# Patient Record
Sex: FEMALE | Race: OTHER | HISPANIC OR LATINO | Employment: UNEMPLOYED | URBAN - METROPOLITAN AREA
[De-identification: names, ages, dates, MRNs, and addresses within clinical notes are randomized per-mention and may not be internally consistent; named-entity substitution may affect disease eponyms.]

---

## 2020-11-13 ENCOUNTER — HOSPITAL ENCOUNTER (OUTPATIENT)
Dept: RADIOLOGY | Facility: MEDICAL CENTER | Age: 30
End: 2020-11-13
Attending: NURSE PRACTITIONER

## 2020-11-13 DIAGNOSIS — E04.1 THYROID NODULE: ICD-10-CM

## 2023-06-22 ENCOUNTER — GYNECOLOGY VISIT (OUTPATIENT)
Dept: OBGYN | Facility: CLINIC | Age: 33
End: 2023-06-22

## 2023-06-22 VITALS
HEIGHT: 65 IN | SYSTOLIC BLOOD PRESSURE: 90 MMHG | DIASTOLIC BLOOD PRESSURE: 52 MMHG | BODY MASS INDEX: 23.19 KG/M2 | WEIGHT: 139.2 LBS

## 2023-06-22 DIAGNOSIS — E07.9 THYROID MASS: ICD-10-CM

## 2023-06-22 DIAGNOSIS — N93.8 DUB (DYSFUNCTIONAL UTERINE BLEEDING): ICD-10-CM

## 2023-06-22 LAB
POCT INT CON NEG: NEGATIVE
POCT INT CON POS: POSITIVE
POCT URINE PREGNANCY TEST: POSITIVE

## 2023-06-22 PROCEDURE — 3078F DIAST BP <80 MM HG: CPT | Performed by: PHYSICIAN ASSISTANT

## 2023-06-22 PROCEDURE — 76805 OB US >/= 14 WKS SNGL FETUS: CPT | Performed by: PHYSICIAN ASSISTANT

## 2023-06-22 PROCEDURE — 81025 URINE PREGNANCY TEST: CPT | Performed by: PHYSICIAN ASSISTANT

## 2023-06-22 PROCEDURE — 3074F SYST BP LT 130 MM HG: CPT | Performed by: PHYSICIAN ASSISTANT

## 2023-06-22 PROCEDURE — 99203 OFFICE O/P NEW LOW 30 MIN: CPT | Performed by: PHYSICIAN ASSISTANT

## 2023-06-22 NOTE — PROGRESS NOTES
"Marvin Quintero is a 33 y.o. female who presents with Gynecologic Exam ()  Pt is somewhat sure of LMP. Dating is by US today.   OBHx: First pregnancy, FOB present and supportive  PMHx: Denies though mass palpated on thyroid - needs US and testing. Pt notes it has been there for ~ 1yr, no changes in size and no pain  SHX: Denies  Allergies: NKDA  Social: Denies tob, etoh or drug use   Meds; Taking PNV only  Pt currently denies cramping, bleeding or pain. No FM.             HPI    Review of Systems   All other systems reviewed and are negative.             Objective     BP 90/52   Ht 5' 4.96\"   Wt 139 lb 3.2 oz   LMP 02/08/2023   BMI 23.19 kg/m²      Physical Exam  Vitals reviewed.   Constitutional:       Appearance: She is well-developed.   HENT:      Head: Normocephalic and atraumatic.   Eyes:      Pupils: Pupils are equal, round, and reactive to light.   Neck:      Thyroid: Thyroid mass (~1cm soft, NT mass palpated LLQ) present. No thyromegaly.     Cardiovascular:      Rate and Rhythm: Normal rate and regular rhythm.      Heart sounds: Normal heart sounds.   Pulmonary:      Effort: Pulmonary effort is normal. No respiratory distress.      Breath sounds: Normal breath sounds.   Abdominal:      General: Bowel sounds are normal. There is no distension.      Palpations: Abdomen is soft.      Tenderness: There is no abdominal tenderness.   Genitourinary:     Exam position: Supine.      Uterus: Enlarged (Gravid, uterus c/w 20-21 wk size). Not deviated and not tender.    Musculoskeletal:      Cervical back: Normal range of motion and neck supple.   Skin:     General: Skin is warm and dry.      Findings: No erythema.   Neurological:      Mental Status: She is alert.      Deep Tendon Reflexes: Reflexes are normal and symmetric.   Psychiatric:         Behavior: Behavior normal.         Thought Content: Thought content normal.               Abd US shows single viable IUP at 21w3d with DANISH " 10/30/23. +FM, +cardiac activity at 147bpm. Ant placenta, MELONIE subjectively wnl. Fetus in vtx position.    BPD: 5.27cm (5.17, 5.41. 5.23)  HC: 19.30cm (19.54, 19.16, 19.19)  AC: 16.29cm (16.22, 16.29, 16.36)  FL: 3.38cm (3.31, 3.44, 3.40)    Per ACOG guidelines, I would recommend changing the DANISH based on today's US, as the DANISH is >10 days different than dates based on LMP. FINAL DANISH 10/30/23 based on US today.                Assessment & Plan        1. DUB (dysfunctional uterine bleeding)  - F/u 1-2 wk for NOB visit, will need PNL and US asap  - First PAP at next visit  - POCT Pregnancy    2. Thyroid mass - palpated LLQ - needs US, lab testing  - Pt needs US and blood testing at next visit after financial appt - pt aware

## 2023-06-22 NOTE — PROGRESS NOTES
Patient here for GYN/  LMP: 2/8/2023  DANISH: 11/15/2023  GA: 19w4d  Last pap: Pt states has never had one.   # [phone]  UPT positive   Pt states no complaints.

## 2023-06-27 ENCOUNTER — APPOINTMENT (OUTPATIENT)
Dept: OBGYN | Facility: CLINIC | Age: 33
End: 2023-06-27

## 2023-07-11 ENCOUNTER — INITIAL PRENATAL (OUTPATIENT)
Dept: OBGYN | Facility: CLINIC | Age: 33
End: 2023-07-11

## 2023-07-11 VITALS — SYSTOLIC BLOOD PRESSURE: 99 MMHG | DIASTOLIC BLOOD PRESSURE: 68 MMHG | BODY MASS INDEX: 23.29 KG/M2 | WEIGHT: 139.8 LBS

## 2023-07-11 DIAGNOSIS — Z34.02 ENCOUNTER FOR SUPERVISION OF NORMAL FIRST PREGNANCY, SECOND TRIMESTER: ICD-10-CM

## 2023-07-11 DIAGNOSIS — E07.9 THYROID MASS: ICD-10-CM

## 2023-07-11 PROCEDURE — 0500F INITIAL PRENATAL CARE VISIT: CPT | Performed by: ADVANCED PRACTICE MIDWIFE

## 2023-07-11 PROCEDURE — 8198 PREG CTR PKG RATE (SYSTEM): Performed by: ADVANCED PRACTICE MIDWIFE

## 2023-07-11 PROCEDURE — 3074F SYST BP LT 130 MM HG: CPT | Performed by: ADVANCED PRACTICE MIDWIFE

## 2023-07-11 PROCEDURE — 3078F DIAST BP <80 MM HG: CPT | Performed by: ADVANCED PRACTICE MIDWIFE

## 2023-07-11 NOTE — PROGRESS NOTES
"Risk factors:   thyroid nodule  Referrals made today:   none    Subjective:   Laura Sheth is a 33 y.o.  who presents for her new OB exam.  She is 24w1d with an DANISH of Estimated Date of Delivery: 10/30/23 by US.   She is feeling well and has no concerns at this time. She reports no ER visits or previous care in this pregnancy. Reports good fetal movement.     Patient was noted to have palpable thyroid nodule that has been present \"for years.\" She last had imaging in  from [facility]. Was recommended to get TSH and reimaging    Vaginal bleeding no  Pain   no  Cramping  no    History reviewed. No pertinent past medical history.    Psych History   Depression:  no  Anxiety   no  Bipolar   no  Postpartum Mood Changes N/a    History reviewed. No pertinent surgical history.     OB History    Para Term  AB Living   1             SAB IAB Ectopic Molar Multiple Live Births                    # Outcome Date GA Lbr Puneet/2nd Weight Sex Delivery Anes PTL Lv   1 Current                 Gynecological History  STIs  no  HSV  no  Abnormal pap no      Family History   Problem Relation Age of Onset    No Known Problems Mother     No Known Problems Father     No Known Problems Brother     No Known Problems Maternal Grandmother     No Known Problems Maternal Grandfather      Denies genetic disorders in family history.     FOB is involved   Pregnancy is un planned but desired.    She is currently not working  .   Denies alcohol use, drug use, or tobacco use in pregnancy.     Denies any current or hx of sexual, emotional or physical abuse or trauma.     Current Medications: PNV    Allergies: NKDA      Objective:      Vitals:    23 0942   BP: 99/68   Weight: 139 lb 12.8 oz        See Prenatal Flowsheet for vitals    Well nourished female in no acute distress  A& O x 3  Heart RRR  LCTAB  No edema noted and pulses WNL bilaterally in lower extremities; no claudication  BS x 4; no guarding or " tenderness    Assessment:      1.  IUP @ 24w1d per US      2.  S=D      3.  See problem list as follows       Patient Active Problem List    Diagnosis Date Noted    DUB (dysfunctional uterine bleeding) 06/22/2023    Thyroid mass - palpated LLQ - needs US, lab testing 06/22/2023         Plan:   -  pap not done today; planning postpartum collection  - Discussed carrier screening and genetic testing in pregnancy. Patient too late for AFP.  - Prenatal labs ordered - lab slip provided  - Discussed PNV, nutrition, adequate water intake, and exercise/weight gain in pregnancy  - S/sx of pregnancy warning signs and PTL precautions given  - Complete OB US in 1 wks  - Return to clinic in 4 weeks.      Discussed with patient thyroid nodule and reimaging. Will also add TSH to labs today.       ONUR Umana

## 2023-07-11 NOTE — PROGRESS NOTES
Pt. Here for NOB visit today.  #[phone]  First prenatal care  Pt. States she feels good, no issues or concerns.   Pharmacy verified  Chaperone offered and indicated

## 2023-07-18 ENCOUNTER — APPOINTMENT (OUTPATIENT)
Dept: OBGYN | Facility: CLINIC | Age: 33
End: 2023-07-18
Payer: MEDICAID

## 2023-07-21 ENCOUNTER — APPOINTMENT (OUTPATIENT)
Dept: RADIOLOGY | Facility: IMAGING CENTER | Age: 33
End: 2023-07-21
Attending: ADVANCED PRACTICE MIDWIFE

## 2023-07-21 DIAGNOSIS — Z34.02 ENCOUNTER FOR SUPERVISION OF NORMAL FIRST PREGNANCY, SECOND TRIMESTER: ICD-10-CM

## 2023-07-21 PROCEDURE — 76805 OB US >/= 14 WKS SNGL FETUS: CPT | Mod: TC | Performed by: ADVANCED PRACTICE MIDWIFE

## 2023-08-10 ENCOUNTER — ROUTINE PRENATAL (OUTPATIENT)
Dept: OBGYN | Facility: CLINIC | Age: 33
End: 2023-08-10

## 2023-08-10 VITALS — WEIGHT: 146 LBS | SYSTOLIC BLOOD PRESSURE: 102 MMHG | DIASTOLIC BLOOD PRESSURE: 70 MMHG | BODY MASS INDEX: 24.32 KG/M2

## 2023-08-10 DIAGNOSIS — Z34.03 SUPERVISION OF NORMAL FIRST PREGNANCY IN THIRD TRIMESTER: Primary | ICD-10-CM

## 2023-08-10 PROCEDURE — 90471 IMMUNIZATION ADMIN: CPT | Performed by: NURSE PRACTITIONER

## 2023-08-10 PROCEDURE — 90715 TDAP VACCINE 7 YRS/> IM: CPT | Performed by: NURSE PRACTITIONER

## 2023-08-10 PROCEDURE — 3078F DIAST BP <80 MM HG: CPT | Performed by: NURSE PRACTITIONER

## 2023-08-10 PROCEDURE — 3074F SYST BP LT 130 MM HG: CPT | Performed by: NURSE PRACTITIONER

## 2023-08-10 PROCEDURE — 0502F SUBSEQUENT PRENATAL CARE: CPT | Performed by: NURSE PRACTITIONER

## 2023-08-10 NOTE — PROGRESS NOTES
S) Pt is a 33 y.o.   at 28w3d  gestation. Routine prenatal care today. No concerns today. Has not done blood work, was not sure where to go. Glucose ordered today, and instructions provided. Will do all blood work tomorrow morning. Has US for her thyroid on 2023. OB US is up to date. Reviewed [facility] today, will get Tdap today. No BTL. PTL precautions reviewed, all questions answered.    Fetal movement Normal  Cramping no  VB no  LOF no   Denies dysuria. Generally feels well today. Good self-care activities identified. Denies headaches, swelling, visual changes, or epigastric pain .     O) /70   Wt 146 lb         Labs:       PNL: Not done yet       GCT: Ordered today        AFP: Not Examined       GBS: N/A       Pertinent ultrasound -        2023- Survey WNL, MELONIE 15.28cm, c/w prev dating. EFW 37.2%    A) IUP at 28w3d       S=D         Patient Active Problem List    Diagnosis Date Noted    Supervision of normal first pregnancy in third trimester 08/10/2023    DUB (dysfunctional uterine bleeding) 2023    Thyroid mass - palpated LLQ - needs US, lab testing 2023          SVE: deferred       Chaperone offered: n/a         TDAP: yes       FLU: no        BTL: no       :  n/a       C/S Consent:  n/a       IOL or C/S scheduled: no       LAST PAP: None on file, will do PP         P) s/s ptl vs general discomforts. Fetal movements reviewed. General ed and anticipatory guidance. Nutrition/exercise/vitamin. Plans breast Plans pp contraception- unsure  Continue PNV.

## 2023-08-10 NOTE — LETTER
Cuente los Movimientos de ly Bebé  Otro paso importante para la roxanna de ly bebé    Laura Sheth     [facility]            Dept: [phone]    ¿Cuántas semanas tiene de embarazo? 28w3d    Fecha cuando tiene que comenzar a contar el movimiento: 08/10/2023                  [place] debe usar dolores diagrama    Loyd manera en que ly doctor puede controlar a roxanna de ly bebé es sabiendo cuantas veces se mueve ly bebé en el útero, o por medio de las “pataditas”.  Usted podrá ayudarle a ly médico al usar cada día el siguiente diagrama.    Cada día, usted debe prestar atención a cuantas horas le lleva a ly bebé moverse 10 veces.  Comience a contar en la mañana, lo antes posible después de haberse levantado.    Primeramente, escriba la hora en que se mueve ly bebé, hasta llegar a 10 veces.  Colóquele un check o palomita a cada cuadrito cada vez que ly bebé se mueva hasta que complete 10 veces.  Escriba la hora cuando termine de contar 10 veces en la última columna.  Sume el total del tiempo que le llevó contar los 10 movimientos.  Finalmente, complete el cuadrito de cuantas horas le llevó hacerlo.    Después de adrian contado los 10 movimientos, ya no tendrá que contar los demás movimientos por el sheila del día.  A la mañana siguiente, comience a contar de nuevo cuantas veces se mueve el bebé desde el momento en que se levante.    ¿Qué tendría que considerarse un “movimiento”?  Es difícil de decirlo porque es distinto de loyd madre a otra, y de un embarazo a otro.  Lo importante es que cuente el movimiento de la misma manera love el transcurso de ly embarazo.  Si tiene preguntas adicionales, pregúntele a ly doctor.    ¡Cuente cuidadosamente cada día!     MUESTRA:  Semana 28    ¿Cuántas horas le ha llevado sentir 10 movimientos?        Hora de Inicio     1     2     3     4     5     6     7     8     9     10   Hora de Finlizar   Ramandeep. 8:20           11:40   Mar.                Mié.               Jue.               Vie.               Sáb.               Dom.                 IMPORTANTE:  Usted debe contactar a ly doctor si le lleva más de 2 horas sentir 10 movimientos de ly bebé.    Cada mañana, escriba la hora de inicio y comience a contar los movimientos de ly bebé.  Hágalo colocándole un check o palomita a cada cuadrito cada vez que sienta un movimiento de ly bebé.  Cuando haya sentido 10 “pataditas”, escriba la hora en que terminó de contar en la última columna.  Luego, complete en la cajita (arriba de la ashok de check o palomita) el número total de horas que le llevó hacerlo.  Asegúrese de leer completamente las instrucciones en la página anterior.

## 2023-08-10 NOTE — PROGRESS NOTES
Pt here today for OB follow up  Pt states no complaints  Reports +FM  Good # [phone] (home)    Pharmacy Confirmed.  Chaperone offered and declined  Tdap today  DEBORAH given

## 2023-08-16 ENCOUNTER — HOSPITAL ENCOUNTER (OUTPATIENT)
Dept: LAB | Facility: MEDICAL CENTER | Age: 33
End: 2023-08-16
Attending: NURSE PRACTITIONER
Payer: COMMERCIAL

## 2023-08-16 ENCOUNTER — HOSPITAL ENCOUNTER (OUTPATIENT)
Dept: RADIOLOGY | Facility: MEDICAL CENTER | Age: 33
End: 2023-08-16
Attending: ADVANCED PRACTICE MIDWIFE
Payer: MEDICAID

## 2023-08-16 DIAGNOSIS — E07.9 THYROID MASS: ICD-10-CM

## 2023-08-16 DIAGNOSIS — Z34.03 SUPERVISION OF NORMAL FIRST PREGNANCY IN THIRD TRIMESTER: ICD-10-CM

## 2023-08-16 DIAGNOSIS — Z34.02 ENCOUNTER FOR SUPERVISION OF NORMAL FIRST PREGNANCY, SECOND TRIMESTER: ICD-10-CM

## 2023-08-16 LAB
ABO GROUP BLD: NORMAL
BLD GP AB SCN SERPL QL: NORMAL
C TRACH DNA SPEC QL NAA+PROBE: NEGATIVE
ERYTHROCYTE [DISTWIDTH] IN BLOOD BY AUTOMATED COUNT: 44.5 FL (ref 35.9–50)
GLUCOSE 1H P 50 G GLC PO SERPL-MCNC: 118 MG/DL (ref 70–139)
HBV SURFACE AG SER QL: ABNORMAL
HCT VFR BLD AUTO: 36.8 % (ref 37–47)
HCV AB SER QL: ABNORMAL
HGB BLD-MCNC: 12 G/DL (ref 12–16)
HIV 1+2 AB+HIV1 P24 AG SERPL QL IA: NORMAL
MCH RBC QN AUTO: 29.9 PG (ref 27–33)
MCHC RBC AUTO-ENTMCNC: 32.6 G/DL (ref 32.2–35.5)
MCV RBC AUTO: 91.5 FL (ref 81.4–97.8)
N GONORRHOEA DNA SPEC QL NAA+PROBE: NEGATIVE
PLATELET # BLD AUTO: 299 K/UL (ref 164–446)
PMV BLD AUTO: 9.8 FL (ref 9–12.9)
RBC # BLD AUTO: 4.02 M/UL (ref 4.2–5.4)
RH BLD: NORMAL
RUBV AB SER QL: 107 IU/ML
SPECIMEN SOURCE: NORMAL
T PALLIDUM AB SER QL IA: ABNORMAL
TSH SERPL DL<=0.005 MIU/L-ACNC: 0.85 UIU/ML (ref 0.38–5.33)
WBC # BLD AUTO: 7.9 K/UL (ref 4.8–10.8)

## 2023-08-16 PROCEDURE — 76536 US EXAM OF HEAD AND NECK: CPT

## 2023-08-18 PROBLEM — N93.8 DUB (DYSFUNCTIONAL UTERINE BLEEDING): Status: RESOLVED | Noted: 2023-06-22 | Resolved: 2023-08-18

## 2023-08-18 LAB
AMPHET CTO UR CFM-MCNC: NEGATIVE NG/ML
BACTERIA UR CULT: NORMAL
BARBITURATES CTO UR CFM-MCNC: NEGATIVE NG/ML
BENZODIAZ CTO UR CFM-MCNC: NEGATIVE NG/ML
CANNABINOIDS CTO UR CFM-MCNC: NEGATIVE NG/ML
COCAINE CTO UR CFM-MCNC: NEGATIVE NG/ML
DRUG COMMENT 753798: NORMAL
METHADONE CTO UR CFM-MCNC: NEGATIVE NG/ML
OPIATES CTO UR CFM-MCNC: NEGATIVE NG/ML
PCP CTO UR CFM-MCNC: NEGATIVE NG/ML
PROPOXYPH CTO UR CFM-MCNC: NEGATIVE NG/ML
SIGNIFICANT IND 70042: NORMAL
SITE SITE: NORMAL
SOURCE SOURCE: NORMAL

## 2023-08-25 ENCOUNTER — ROUTINE PRENATAL (OUTPATIENT)
Dept: OBGYN | Facility: CLINIC | Age: 33
End: 2023-08-25

## 2023-08-25 VITALS — BODY MASS INDEX: 24.66 KG/M2 | WEIGHT: 148 LBS | DIASTOLIC BLOOD PRESSURE: 60 MMHG | SYSTOLIC BLOOD PRESSURE: 90 MMHG

## 2023-08-25 DIAGNOSIS — Z34.03 ENCOUNTER FOR SUPERVISION OF NORMAL FIRST PREGNANCY, THIRD TRIMESTER: Primary | ICD-10-CM

## 2023-08-25 PROCEDURE — 3078F DIAST BP <80 MM HG: CPT | Performed by: NURSE PRACTITIONER

## 2023-08-25 PROCEDURE — 3074F SYST BP LT 130 MM HG: CPT | Performed by: NURSE PRACTITIONER

## 2023-08-25 PROCEDURE — 0502F SUBSEQUENT PRENATAL CARE: CPT | Performed by: NURSE PRACTITIONER

## 2023-08-25 NOTE — PROGRESS NOTES
S) Pt is a 33 y.o.   at 30w4d  gestation. Routine prenatal care today. No concerns today. Reviewed recent lab and US results, all questions answered. Aware of need for repeat thyroid US in 1 year, but TSH was normal. All other labs and US are up to date. Will need GBS at 36 weeks. PTL precautions reviewed, all questions answered.   Fetal movement Normal  Cramping no  VB no  LOF no   Denies dysuria. Generally feels well today. Good self-care activities identified. Denies headaches, swelling, visual changes, or epigastric pain .     O) BP 90/60   Wt 148 lb         Labs:       PNL: WNL       GCT: 118        AFP: Not Examined       GBS: N/A       Pertinent ultrasound -        2023- Survey WNL, MELONIE 15.28cm, c/w prev dating. EFW 37.2%    A) IUP at 30w4d       S=D         Patient Active Problem List    Diagnosis Date Noted    Encounter for supervision of normal first pregnancy, third trimester 2023    Thyroid mass - palpated LLQ - needs US, lab testing 2023          SVE: deferred       Chaperone offered: n/a         TDAP: yes       FLU: no        BTL: no       :  n/a       C/S Consent:  n/a       IOL or C/S scheduled: no       LAST PAP: None on file, will do at PP visit         P) s/s ptl vs general discomforts. Fetal movements reviewed. General ed and anticipatory guidance. Nutrition/exercise/vitamin. Plans breast Plans pp contraception- unsure  Continue PNV.

## 2023-09-20 ENCOUNTER — ROUTINE PRENATAL (OUTPATIENT)
Dept: OBGYN | Facility: CLINIC | Age: 33
End: 2023-09-20

## 2023-09-20 VITALS — DIASTOLIC BLOOD PRESSURE: 60 MMHG | SYSTOLIC BLOOD PRESSURE: 100 MMHG | BODY MASS INDEX: 24.99 KG/M2 | WEIGHT: 150 LBS

## 2023-09-20 DIAGNOSIS — L29.9 PRURITUS: ICD-10-CM

## 2023-09-20 PROCEDURE — 0502F SUBSEQUENT PRENATAL CARE: CPT | Performed by: ADVANCED PRACTICE MIDWIFE

## 2023-09-20 PROCEDURE — 3078F DIAST BP <80 MM HG: CPT | Performed by: ADVANCED PRACTICE MIDWIFE

## 2023-09-20 PROCEDURE — 3074F SYST BP LT 130 MM HG: CPT | Performed by: ADVANCED PRACTICE MIDWIFE

## 2023-09-20 NOTE — PROGRESS NOTES
Pt here today for OB follow up  Pt states she has been experiencing all over body itching only when she is in her apartment  Reports +FM  Good # [phone]  Pharmacy Confirmed.  Chaperone offered and not indicated.

## 2023-09-25 NOTE — PROGRESS NOTES
Patient presents to clinic for SHAHEED visit. She affirms fetal movement, denies vaginal bleeding or cramping. She is reporting itching all over her body but only when she is at her home. No other family members have these symptoms at current. They do have a dog but it does not have fleas. Her partner who is present today denies presence of bed bugs or scabies.   Exam shows eczema diffusely over body. However, abdomen is spared. Do not suspect PUPPs or cholestasis related to it being diffusely over body. Curious for psychosomatic component since it only occurs at home and resolves spontaneously. Could also be allergen but patient reporting no hives or changes to detergent or soap. Will monitor. Can use OTC cream at current.

## 2023-09-26 ENCOUNTER — HOSPITAL ENCOUNTER (OUTPATIENT)
Dept: LAB | Facility: MEDICAL CENTER | Age: 33
End: 2023-09-26
Attending: ADVANCED PRACTICE MIDWIFE
Payer: MEDICAID

## 2023-09-26 DIAGNOSIS — L29.9 PRURITUS: ICD-10-CM

## 2023-09-26 LAB
ALBUMIN SERPL BCP-MCNC: 3.6 G/DL (ref 3.2–4.9)
ALBUMIN/GLOB SERPL: 1 G/DL
ALP SERPL-CCNC: 217 U/L (ref 30–99)
ALT SERPL-CCNC: 29 U/L (ref 2–50)
ANION GAP SERPL CALC-SCNC: 12 MMOL/L (ref 7–16)
AST SERPL-CCNC: 24 U/L (ref 12–45)
BILIRUB SERPL-MCNC: 0.4 MG/DL (ref 0.1–1.5)
BUN SERPL-MCNC: 7 MG/DL (ref 8–22)
CALCIUM ALBUM COR SERPL-MCNC: 10.2 MG/DL (ref 8.5–10.5)
CALCIUM SERPL-MCNC: 9.9 MG/DL (ref 8.5–10.5)
CHLORIDE SERPL-SCNC: 100 MMOL/L (ref 96–112)
CO2 SERPL-SCNC: 21 MMOL/L (ref 20–33)
CREAT SERPL-MCNC: 0.45 MG/DL (ref 0.5–1.4)
GFR SERPLBLD CREATININE-BSD FMLA CKD-EPI: 131 ML/MIN/1.73 M 2
GLOBULIN SER CALC-MCNC: 3.5 G/DL (ref 1.9–3.5)
GLUCOSE SERPL-MCNC: 72 MG/DL (ref 65–99)
POTASSIUM SERPL-SCNC: 4 MMOL/L (ref 3.6–5.5)
PROT SERPL-MCNC: 7.1 G/DL (ref 6–8.2)
SODIUM SERPL-SCNC: 133 MMOL/L (ref 135–145)

## 2023-09-26 PROCEDURE — 36415 COLL VENOUS BLD VENIPUNCTURE: CPT

## 2023-09-26 PROCEDURE — 82239 BILE ACIDS TOTAL: CPT

## 2023-09-26 PROCEDURE — 80053 COMPREHEN METABOLIC PANEL: CPT

## 2023-09-28 LAB — BILE AC SERPL-SCNC: 3 UMOL/L (ref 0–10)

## 2023-10-04 ENCOUNTER — HOSPITAL ENCOUNTER (OUTPATIENT)
Facility: MEDICAL CENTER | Age: 33
End: 2023-10-04
Attending: STUDENT IN AN ORGANIZED HEALTH CARE EDUCATION/TRAINING PROGRAM
Payer: COMMERCIAL

## 2023-10-04 ENCOUNTER — ROUTINE PRENATAL (OUTPATIENT)
Dept: OBGYN | Facility: CLINIC | Age: 33
End: 2023-10-04
Payer: MEDICAID

## 2023-10-04 VITALS — WEIGHT: 151.8 LBS | SYSTOLIC BLOOD PRESSURE: 112 MMHG | DIASTOLIC BLOOD PRESSURE: 54 MMHG | BODY MASS INDEX: 25.29 KG/M2

## 2023-10-04 DIAGNOSIS — Z34.03 ENCOUNTER FOR SUPERVISION OF NORMAL FIRST PREGNANCY, THIRD TRIMESTER: ICD-10-CM

## 2023-10-04 DIAGNOSIS — L29.9 PRURITUS: ICD-10-CM

## 2023-10-04 PROCEDURE — 3074F SYST BP LT 130 MM HG: CPT | Performed by: STUDENT IN AN ORGANIZED HEALTH CARE EDUCATION/TRAINING PROGRAM

## 2023-10-04 PROCEDURE — 3078F DIAST BP <80 MM HG: CPT | Performed by: STUDENT IN AN ORGANIZED HEALTH CARE EDUCATION/TRAINING PROGRAM

## 2023-10-04 PROCEDURE — 0502F SUBSEQUENT PRENATAL CARE: CPT | Performed by: STUDENT IN AN ORGANIZED HEALTH CARE EDUCATION/TRAINING PROGRAM

## 2023-10-04 RX ORDER — HYDROXYZINE HYDROCHLORIDE 25 MG/1
25 TABLET, FILM COATED ORAL EVERY 6 HOURS PRN
Qty: 30 TABLET | Refills: 3 | Status: ON HOLD | OUTPATIENT
Start: 2023-10-04 | End: 2023-10-23

## 2023-10-04 ASSESSMENT — FIBROSIS 4 INDEX: FIB4 SCORE: 0.48

## 2023-10-04 NOTE — LETTER
Cuente los Movimientos de ly Bebé  Otro paso importante para la roxanna de ly bebé    Laura Sheth     [facility]            Dept: [phone]    ¿Cuántas semanas tiene de embarazo? 36w2d    Fecha cuando tiene que comenzar a contar el movimiento: 10/04/2023                  [place] debe usar dolores diagrama    Loyd manera en que ly doctor puede controlar a roxanna de ly bebé es sabiendo cuantas veces se mueve ly bebé en el útero, o por medio de las “pataditas”.  Usted podrá ayudarle a ly médico al usar cada día el siguiente diagrama.    Cada día, usted debe prestar atención a cuantas horas le lleva a ly bebé moverse 10 veces.  Comience a contar en la mañana, lo antes posible después de haberse levantado.    Primeramente, escriba la hora en que se mueve ly bebé, hasta llegar a 10 veces.  Colóquele un check o palomita a cada cuadrito cada vez que ly bebé se mueva hasta que complete 10 veces.  Escriba la hora cuando termine de contar 10 veces en la última columna.  Sume el total del tiempo que le llevó contar los 10 movimientos.  Finalmente, complete el cuadrito de cuantas horas le llevó hacerlo.    Después de adrian contado los 10 movimientos, ya no tendrá que contar los demás movimientos por el sheila del día.  A la mañana siguiente, comience a contar de nuevo cuantas veces se mueve el bebé desde el momento en que se levante.    ¿Qué tendría que considerarse un “movimiento”?  Es difícil de decirlo porque es distinto de loyd madre a otra, y de un embarazo a otro.  Lo importante es que cuente el movimiento de la misma manera love el transcurso de ly embarazo.  Si tiene preguntas adicionales, pregúntele a ly doctor.    ¡Cuente cuidadosamente cada día!     MUESTRA:  Semana 28    ¿Cuántas horas le ha llevado sentir 10 movimientos?        Hora de Inicio     1     2     3     4     5     6     7     8     9     10   Hora de Finlizar   Ramandeep. 8:20           11:40   Mar.                Mié.               Jue.               Vie.               Sáb.               Dom.                 IMPORTANTE:  Usted debe contactar a ly doctor si le lleva más de 2 horas sentir 10 movimientos de ly bebé.    Cada mañana, escriba la hora de inicio y comience a contar los movimientos de ly bebé.  Hágalo colocándole un check o palomita a cada cuadrito cada vez que sienta un movimiento de ly bebé.  Cuando haya sentido 10 “pataditas”, escriba la hora en que terminó de contar en la última columna.  Luego, complete en la cajita (arriba de la ashok de check o palomita) el número total de horas que le llevó hacerlo.  Asegúrese de leer completamente las instrucciones en la página anterior.

## 2023-10-04 NOTE — PROGRESS NOTES
"Pt here today for OB follow up  Pt unsure of FM though reports feeling \"pulsing\" on her lower abdomen.   WT: 151.8 lb   BP: 112/54  Preferred pharmacy verified with pt.  MFM Appointment: not at this time  DEBORAH sheet given and explained today (pt states she has not previously been explained hoe to count the FM nor given the DEBORAH sheet )  Influenza vaccine offered today, pt would like to think about it and will let us know at her next visit  Pt declines BTL  Pt states has been feeling itching all over her body for 1 month. Had Bile Acids labs done on 09/26/23  Good # [phone]    GBS to be done today    "

## 2023-10-04 NOTE — PROGRESS NOTES
OB Visit Note - 36w2d     MEDICAL DECISION MAKING:  Laura Sheth is a 33 y.o. female  at 36w2d     S: Pt presents for routine OB follow up. Reports difficulty deciphering fetal movement this entire pregnancy but does feel some.  More obvious movements at night.  No contractions, vaginal bleeding, or leakage of fluid.  Is interested in flu vaccine possibly next visit.      Questions answered.     O:  Vitals:    10/04/23 1350   BP: 112/54     Patients' weight gain, fluid intake and exercise level discussed.    FH: 36 cm  FHT: 150 bpm    Presentation: Breech via limited bedside US     See flow sheet.        Recent Labs     23  0954 23  1000   ABOGROUP A  --    ABSCRN NEG  --    HEMOGLOBIN 12.0  --    PLATELETCT 299  --    RUBELLAIGG 107.00  --    HEPBSAG Non-Reactive  --    HEPCAB Non-Reactive  --    GCBYDNAPR  --  Negative   TSHULTRASEN 0.850  --           A/P:Laura Sheth is a 33 y.o. female  at 36w2d who presents for routine obstetric follow-up.    P:  Discussed FKCs. Given handout  Labor and return precautions given.  Instructions given on where to go - patient verbalized understanding.  All questions answered. Anticipatory guidance.  Encouraged adequate water intake.  PP contraception: undecided  May be interested in flu vaccine next visit  GBS collected today  Breech on limited US today; discussed this with patient and her partner.  Will schedule for primary c/s at 39 weeks.  Patient desires repeat US for presentation at appt next week  F/u in 1 week(s) and PRN     Pregnancy complicated by:  Patient Active Problem List   Diagnosis    Thyroid mass - palpated LLQ - needs US, lab testing    Encounter for supervision of normal first pregnancy, third trimester       The patient will follow up in 1 week(s). She was counseled to call and/or report to L&D for vaginal bleeding, regular contractions, leakage of fluid or decreased fetal movement.    Jose ZHOU  ROMIE Rees.

## 2023-10-05 LAB — GP B STREP DNA SPEC QL NAA+PROBE: NEGATIVE

## 2023-10-10 ENCOUNTER — PRE-ADMISSION TESTING (OUTPATIENT)
Dept: ADMISSIONS | Facility: MEDICAL CENTER | Age: 33
End: 2023-10-10
Attending: OBSTETRICS & GYNECOLOGY
Payer: MEDICAID

## 2023-10-11 ENCOUNTER — ROUTINE PRENATAL (OUTPATIENT)
Dept: OBGYN | Facility: CLINIC | Age: 33
End: 2023-10-11

## 2023-10-11 ENCOUNTER — APPOINTMENT (OUTPATIENT)
Dept: OBGYN | Facility: CLINIC | Age: 33
End: 2023-10-11

## 2023-10-11 VITALS — SYSTOLIC BLOOD PRESSURE: 102 MMHG | DIASTOLIC BLOOD PRESSURE: 58 MMHG | WEIGHT: 152 LBS | BODY MASS INDEX: 25.32 KG/M2

## 2023-10-11 DIAGNOSIS — O09.93 ENCOUNTER FOR SUPERVISION OF HIGH RISK PREGNANCY IN THIRD TRIMESTER, ANTEPARTUM: Primary | ICD-10-CM

## 2023-10-11 PROCEDURE — 3078F DIAST BP <80 MM HG: CPT | Performed by: NURSE PRACTITIONER

## 2023-10-11 PROCEDURE — 0502F SUBSEQUENT PRENATAL CARE: CPT | Performed by: NURSE PRACTITIONER

## 2023-10-11 PROCEDURE — 3074F SYST BP LT 130 MM HG: CPT | Performed by: NURSE PRACTITIONER

## 2023-10-11 ASSESSMENT — FIBROSIS 4 INDEX: FIB4 SCORE: 0.48

## 2023-10-11 NOTE — PROGRESS NOTES
OB follow up   + fetal movement.  No VB, LOF or UC's.  Phone # [phone]  Preferred pharmacy confirmed.  Flu vaccine offered and declined  GBS negative

## 2023-10-11 NOTE — PROGRESS NOTES
S:  Pt is  at 37w2d here for routine OB follow up.  Pt desires recheck of fetal position.  Pt does not want a  and is hoping baby has turned vertex.  No ED or hospital visits since last seen. Reports good FM.  Denies VB, LOF, RUCs, or vaginal DC.     O:  See above vitals        Fetal position: breech per US        GBS negative  -- reviewed w pt.           Section scheduled 10/23/23 at 0930    A:  IUP at 37w2d  Patient Active Problem List    Diagnosis Date Noted    Encounter for supervision of normal first pregnancy, third trimester 2023    Thyroid mass - palpated LLQ - needs US, lab testing 2023       P:  1.  Anticipatory guidance given         2.  Labor precautions given.  Instructions given on where to go.  Pt receptive to education.         3. Pt given exercises to try to help fetus move from breech to vertex       4.  Questions answered.         5.  Encouraged adequate water intake, walking 30-60 min daily, pelvic rocking, birthing ball       6.  F/u 1wk       7.  Will recheck position at next appt    ROLANDO Kimball CNM

## 2023-10-17 ENCOUNTER — PRE-ADMISSION TESTING (OUTPATIENT)
Dept: ADMISSIONS | Facility: MEDICAL CENTER | Age: 33
End: 2023-10-17
Attending: OBSTETRICS & GYNECOLOGY
Payer: MEDICAID

## 2023-10-19 ENCOUNTER — ROUTINE PRENATAL (OUTPATIENT)
Dept: OBGYN | Facility: CLINIC | Age: 33
End: 2023-10-19

## 2023-10-19 VITALS — WEIGHT: 153 LBS | BODY MASS INDEX: 25.49 KG/M2 | SYSTOLIC BLOOD PRESSURE: 98 MMHG | DIASTOLIC BLOOD PRESSURE: 58 MMHG

## 2023-10-19 DIAGNOSIS — Z34.03 ENCOUNTER FOR SUPERVISION OF NORMAL FIRST PREGNANCY, THIRD TRIMESTER: ICD-10-CM

## 2023-10-19 PROCEDURE — 90686 IIV4 VACC NO PRSV 0.5 ML IM: CPT | Performed by: PHYSICIAN ASSISTANT

## 2023-10-19 PROCEDURE — 3078F DIAST BP <80 MM HG: CPT | Performed by: PHYSICIAN ASSISTANT

## 2023-10-19 PROCEDURE — 0502F SUBSEQUENT PRENATAL CARE: CPT | Performed by: PHYSICIAN ASSISTANT

## 2023-10-19 PROCEDURE — 90471 IMMUNIZATION ADMIN: CPT | Performed by: PHYSICIAN ASSISTANT

## 2023-10-19 PROCEDURE — 3074F SYST BP LT 130 MM HG: CPT | Performed by: PHYSICIAN ASSISTANT

## 2023-10-19 ASSESSMENT — FIBROSIS 4 INDEX: FIB4 SCORE: 0.48

## 2023-10-19 NOTE — PROGRESS NOTES
Pt. Here for OB/FU. Reports Good FM.   Good # [phone]  Pt states no complaints.   C/S on 10/23/2023 @ 8254 am, Dr. German, pt aware   GBS negative   FLU today

## 2023-10-19 NOTE — PROGRESS NOTES
Pt has no complaints with cramping, UCs, VB, LOF. +FM. Pt is very adamant she wants to wait longer for vag delivery, but US confirms breech position again today. D/w pt risks through MA  that I highly recommend she goes to hospital on 10/23 for primary C/S and can talk to the doc about a version or waiting, but that it is unlikely the baby will change position at this time and it is not recommended she try for a vaginal delivery with a breech position. Placenta is fundal/anterior, which makes her a poor candidate for ECV. Flu vax given today, GBS neg. Pt aware she has the option to wait, but that I highly recommend she keeps appt on 10/23 to talk to physician. RTC prn or to L&D on 10/23. Labor precautions reviewed with pt.

## 2023-10-23 ENCOUNTER — ANESTHESIA (OUTPATIENT)
Dept: OBGYN | Facility: MEDICAL CENTER | Age: 33
End: 2023-10-23
Payer: MEDICAID

## 2023-10-23 ENCOUNTER — ANESTHESIA EVENT (OUTPATIENT)
Dept: OBGYN | Facility: MEDICAL CENTER | Age: 33
End: 2023-10-23
Payer: MEDICAID

## 2023-10-23 ENCOUNTER — HOSPITAL ENCOUNTER (INPATIENT)
Facility: MEDICAL CENTER | Age: 33
LOS: 3 days | End: 2023-10-26
Attending: OBSTETRICS & GYNECOLOGY | Admitting: OBSTETRICS & GYNECOLOGY
Payer: MEDICAID

## 2023-10-23 LAB
BASOPHILS # BLD AUTO: 0.8 % (ref 0–1.8)
BASOPHILS # BLD: 0.05 K/UL (ref 0–0.12)
EOSINOPHIL # BLD AUTO: 0.08 K/UL (ref 0–0.51)
EOSINOPHIL NFR BLD: 1.3 % (ref 0–6.9)
ERYTHROCYTE [DISTWIDTH] IN BLOOD BY AUTOMATED COUNT: 44.3 FL (ref 35.9–50)
HCT VFR BLD AUTO: 39.7 % (ref 37–47)
HGB BLD-MCNC: 13.1 G/DL (ref 12–16)
HOLDING TUBE BB 8507: NORMAL
IMM GRANULOCYTES # BLD AUTO: 0.02 K/UL (ref 0–0.11)
IMM GRANULOCYTES NFR BLD AUTO: 0.3 % (ref 0–0.9)
LYMPHOCYTES # BLD AUTO: 1.61 K/UL (ref 1–4.8)
LYMPHOCYTES NFR BLD: 26.4 % (ref 22–41)
MCH RBC QN AUTO: 29.6 PG (ref 27–33)
MCHC RBC AUTO-ENTMCNC: 33 G/DL (ref 32.2–35.5)
MCV RBC AUTO: 89.8 FL (ref 81.4–97.8)
MONOCYTES # BLD AUTO: 0.61 K/UL (ref 0–0.85)
MONOCYTES NFR BLD AUTO: 10 % (ref 0–13.4)
NEUTROPHILS # BLD AUTO: 3.73 K/UL (ref 1.82–7.42)
NEUTROPHILS NFR BLD: 61.2 % (ref 44–72)
NRBC # BLD AUTO: 0 K/UL
NRBC BLD-RTO: 0 /100 WBC (ref 0–0.2)
PLATELET # BLD AUTO: 210 K/UL (ref 164–446)
PMV BLD AUTO: 11.2 FL (ref 9–12.9)
RBC # BLD AUTO: 4.42 M/UL (ref 4.2–5.4)
T PALLIDUM AB SER QL IA: NORMAL
WBC # BLD AUTO: 6.1 K/UL (ref 4.8–10.8)

## 2023-10-23 PROCEDURE — 700111 HCHG RX REV CODE 636 W/ 250 OVERRIDE (IP): Performed by: OBSTETRICS & GYNECOLOGY

## 2023-10-23 PROCEDURE — 700111 HCHG RX REV CODE 636 W/ 250 OVERRIDE (IP): Performed by: ANESTHESIOLOGY

## 2023-10-23 PROCEDURE — 160041 HCHG SURGERY MINUTES - EA ADDL 1 MIN LEVEL 4: Performed by: OBSTETRICS & GYNECOLOGY

## 2023-10-23 PROCEDURE — 700111 HCHG RX REV CODE 636 W/ 250 OVERRIDE (IP): Mod: JZ | Performed by: OBSTETRICS & GYNECOLOGY

## 2023-10-23 PROCEDURE — 160048 HCHG OR STATISTICAL LEVEL 1-5: Performed by: OBSTETRICS & GYNECOLOGY

## 2023-10-23 PROCEDURE — 99999 PR NO CHARGE: CPT | Performed by: FAMILY MEDICINE

## 2023-10-23 PROCEDURE — 700105 HCHG RX REV CODE 258: Performed by: ANESTHESIOLOGY

## 2023-10-23 PROCEDURE — 160009 HCHG ANES TIME/MIN: Performed by: OBSTETRICS & GYNECOLOGY

## 2023-10-23 PROCEDURE — 770002 HCHG ROOM/CARE - OB PRIVATE (112)

## 2023-10-23 PROCEDURE — 160029 HCHG SURGERY MINUTES - 1ST 30 MINS LEVEL 4: Performed by: OBSTETRICS & GYNECOLOGY

## 2023-10-23 PROCEDURE — 160035 HCHG PACU - 1ST 60 MINS PHASE I: Performed by: OBSTETRICS & GYNECOLOGY

## 2023-10-23 PROCEDURE — 700111 HCHG RX REV CODE 636 W/ 250 OVERRIDE (IP)

## 2023-10-23 PROCEDURE — 700102 HCHG RX REV CODE 250 W/ 637 OVERRIDE(OP): Performed by: ANESTHESIOLOGY

## 2023-10-23 PROCEDURE — 700101 HCHG RX REV CODE 250: Performed by: ANESTHESIOLOGY

## 2023-10-23 PROCEDURE — C1755 CATHETER, INTRASPINAL: HCPCS | Performed by: OBSTETRICS & GYNECOLOGY

## 2023-10-23 PROCEDURE — 36415 COLL VENOUS BLD VENIPUNCTURE: CPT

## 2023-10-23 PROCEDURE — 86780 TREPONEMA PALLIDUM: CPT

## 2023-10-23 PROCEDURE — 700105 HCHG RX REV CODE 258

## 2023-10-23 PROCEDURE — 700111 HCHG RX REV CODE 636 W/ 250 OVERRIDE (IP): Mod: JZ | Performed by: ANESTHESIOLOGY

## 2023-10-23 PROCEDURE — 700105 HCHG RX REV CODE 258: Performed by: OBSTETRICS & GYNECOLOGY

## 2023-10-23 PROCEDURE — A9270 NON-COVERED ITEM OR SERVICE: HCPCS | Performed by: ANESTHESIOLOGY

## 2023-10-23 PROCEDURE — 59514 CESAREAN DELIVERY ONLY: CPT | Performed by: OBSTETRICS & GYNECOLOGY

## 2023-10-23 PROCEDURE — 59514 CESAREAN DELIVERY ONLY: CPT | Mod: 80 | Performed by: FAMILY MEDICINE

## 2023-10-23 PROCEDURE — 160002 HCHG RECOVERY MINUTES (STAT): Performed by: OBSTETRICS & GYNECOLOGY

## 2023-10-23 PROCEDURE — 85025 COMPLETE CBC W/AUTO DIFF WBC: CPT

## 2023-10-23 RX ORDER — KETOROLAC TROMETHAMINE 30 MG/ML
30 INJECTION, SOLUTION INTRAMUSCULAR; INTRAVENOUS EVERY 6 HOURS
Status: DISCONTINUED | OUTPATIENT
Start: 2023-10-23 | End: 2023-10-23

## 2023-10-23 RX ORDER — SIMETHICONE 125 MG
125 TABLET,CHEWABLE ORAL 4 TIMES DAILY PRN
Status: DISCONTINUED | OUTPATIENT
Start: 2023-10-23 | End: 2023-10-26 | Stop reason: HOSPADM

## 2023-10-23 RX ORDER — ONDANSETRON 2 MG/ML
4 INJECTION INTRAMUSCULAR; INTRAVENOUS
Status: DISCONTINUED | OUTPATIENT
Start: 2023-10-23 | End: 2023-10-23 | Stop reason: HOSPADM

## 2023-10-23 RX ORDER — CITRIC ACID/SODIUM CITRATE 334-500MG
30 SOLUTION, ORAL ORAL ONCE
Status: COMPLETED | OUTPATIENT
Start: 2023-10-23 | End: 2023-10-23

## 2023-10-23 RX ORDER — LABETALOL HYDROCHLORIDE 5 MG/ML
5 INJECTION, SOLUTION INTRAVENOUS
Status: DISCONTINUED | OUTPATIENT
Start: 2023-10-23 | End: 2023-10-23 | Stop reason: HOSPADM

## 2023-10-23 RX ORDER — SODIUM CHLORIDE, SODIUM LACTATE, POTASSIUM CHLORIDE, CALCIUM CHLORIDE 600; 310; 30; 20 MG/100ML; MG/100ML; MG/100ML; MG/100ML
INJECTION, SOLUTION INTRAVENOUS ONCE
Status: ACTIVE | OUTPATIENT
Start: 2023-10-23 | End: 2023-10-24

## 2023-10-23 RX ORDER — HYDROMORPHONE HYDROCHLORIDE 1 MG/ML
0.4 INJECTION, SOLUTION INTRAMUSCULAR; INTRAVENOUS; SUBCUTANEOUS
Status: ACTIVE | OUTPATIENT
Start: 2023-10-23 | End: 2023-10-24

## 2023-10-23 RX ORDER — VITAMIN A ACETATE, BETA CAROTENE, ASCORBIC ACID, CHOLECALCIFEROL, .ALPHA.-TOCOPHEROL ACETATE, DL-, THIAMINE MONONITRATE, RIBOFLAVIN, NIACINAMIDE, PYRIDOXINE HYDROCHLORIDE, FOLIC ACID, CYANOCOBALAMIN, CALCIUM CARBONATE, FERROUS FUMARATE, ZINC OXIDE, CUPRIC OXIDE 3080; 12; 120; 400; 1; 1.84; 3; 20; 22; 920; 25; 200; 27; 10; 2 [IU]/1; UG/1; MG/1; [IU]/1; MG/1; MG/1; MG/1; MG/1; MG/1; [IU]/1; MG/1; MG/1; MG/1; MG/1; MG/1
1 TABLET, FILM COATED ORAL
Status: DISCONTINUED | OUTPATIENT
Start: 2023-10-24 | End: 2023-10-26 | Stop reason: HOSPADM

## 2023-10-23 RX ORDER — MORPHINE SULFATE 0.5 MG/ML
INJECTION, SOLUTION EPIDURAL; INTRATHECAL; INTRAVENOUS
Status: COMPLETED | OUTPATIENT
Start: 2023-10-23 | End: 2023-10-23

## 2023-10-23 RX ORDER — IBUPROFEN 800 MG/1
800 TABLET ORAL EVERY 8 HOURS PRN
Status: DISCONTINUED | OUTPATIENT
Start: 2023-10-27 | End: 2023-10-26 | Stop reason: HOSPADM

## 2023-10-23 RX ORDER — OXYCODONE HYDROCHLORIDE 10 MG/1
10 TABLET ORAL EVERY 4 HOURS PRN
Status: ACTIVE | OUTPATIENT
Start: 2023-10-23 | End: 2023-10-24

## 2023-10-23 RX ORDER — CALCIUM CARBONATE 500 MG/1
1000 TABLET, CHEWABLE ORAL EVERY 6 HOURS PRN
Status: DISCONTINUED | OUTPATIENT
Start: 2023-10-23 | End: 2023-10-26 | Stop reason: HOSPADM

## 2023-10-23 RX ORDER — ONDANSETRON 2 MG/ML
INJECTION INTRAMUSCULAR; INTRAVENOUS PRN
Status: DISCONTINUED | OUTPATIENT
Start: 2023-10-23 | End: 2023-10-23 | Stop reason: SURG

## 2023-10-23 RX ORDER — DIPHENHYDRAMINE HYDROCHLORIDE 50 MG/ML
25 INJECTION INTRAMUSCULAR; INTRAVENOUS EVERY 6 HOURS PRN
Status: DISCONTINUED | OUTPATIENT
Start: 2023-10-24 | End: 2023-10-26 | Stop reason: HOSPADM

## 2023-10-23 RX ORDER — POLYETHYLENE GLYCOL 3350 17 G/17G
1 POWDER, FOR SOLUTION ORAL DAILY
Status: DISCONTINUED | OUTPATIENT
Start: 2023-10-23 | End: 2023-10-26 | Stop reason: HOSPADM

## 2023-10-23 RX ORDER — EPHEDRINE SULFATE 50 MG/ML
5 INJECTION, SOLUTION INTRAVENOUS
Status: DISCONTINUED | OUTPATIENT
Start: 2023-10-23 | End: 2023-10-23 | Stop reason: HOSPADM

## 2023-10-23 RX ORDER — OXYCODONE HCL 5 MG/5 ML
10 SOLUTION, ORAL ORAL
Status: DISCONTINUED | OUTPATIENT
Start: 2023-10-23 | End: 2023-10-23 | Stop reason: HOSPADM

## 2023-10-23 RX ORDER — DIPHENHYDRAMINE HYDROCHLORIDE 50 MG/ML
12.5 INJECTION INTRAMUSCULAR; INTRAVENOUS EVERY 6 HOURS PRN
Status: ACTIVE | OUTPATIENT
Start: 2023-10-23 | End: 2023-10-24

## 2023-10-23 RX ORDER — BUPIVACAINE HYDROCHLORIDE 7.5 MG/ML
INJECTION, SOLUTION INTRASPINAL
Status: COMPLETED | OUTPATIENT
Start: 2023-10-23 | End: 2023-10-23

## 2023-10-23 RX ORDER — SODIUM CHLORIDE, SODIUM GLUCONATE, SODIUM ACETATE, POTASSIUM CHLORIDE AND MAGNESIUM CHLORIDE 526; 502; 368; 37; 30 MG/100ML; MG/100ML; MG/100ML; MG/100ML; MG/100ML
INJECTION, SOLUTION INTRAVENOUS
Status: DISCONTINUED | OUTPATIENT
Start: 2023-10-23 | End: 2023-10-23 | Stop reason: SURG

## 2023-10-23 RX ORDER — HALOPERIDOL 5 MG/ML
1 INJECTION INTRAMUSCULAR
Status: DISCONTINUED | OUTPATIENT
Start: 2023-10-23 | End: 2023-10-23 | Stop reason: HOSPADM

## 2023-10-23 RX ORDER — OXYCODONE HYDROCHLORIDE 10 MG/1
10 TABLET ORAL EVERY 4 HOURS PRN
Status: DISCONTINUED | OUTPATIENT
Start: 2023-10-24 | End: 2023-10-26 | Stop reason: HOSPADM

## 2023-10-23 RX ORDER — KETOROLAC TROMETHAMINE 30 MG/ML
30 INJECTION, SOLUTION INTRAMUSCULAR; INTRAVENOUS EVERY 6 HOURS
Status: COMPLETED | OUTPATIENT
Start: 2023-10-23 | End: 2023-10-24

## 2023-10-23 RX ORDER — DIPHENHYDRAMINE HCL 25 MG
25 TABLET ORAL EVERY 6 HOURS PRN
Status: DISCONTINUED | OUTPATIENT
Start: 2023-10-24 | End: 2023-10-26 | Stop reason: HOSPADM

## 2023-10-23 RX ORDER — HYDROMORPHONE HYDROCHLORIDE 1 MG/ML
0.2 INJECTION, SOLUTION INTRAMUSCULAR; INTRAVENOUS; SUBCUTANEOUS
Status: ACTIVE | OUTPATIENT
Start: 2023-10-23 | End: 2023-10-24

## 2023-10-23 RX ORDER — SODIUM CHLORIDE, SODIUM LACTATE, POTASSIUM CHLORIDE, CALCIUM CHLORIDE 600; 310; 30; 20 MG/100ML; MG/100ML; MG/100ML; MG/100ML
INJECTION, SOLUTION INTRAVENOUS CONTINUOUS
Status: DISCONTINUED | OUTPATIENT
Start: 2023-10-23 | End: 2023-10-26 | Stop reason: HOSPADM

## 2023-10-23 RX ORDER — CEFAZOLIN SODIUM 1 G/3ML
2 INJECTION, POWDER, FOR SOLUTION INTRAMUSCULAR; INTRAVENOUS ONCE
Status: COMPLETED | OUTPATIENT
Start: 2023-10-23 | End: 2023-10-23

## 2023-10-23 RX ORDER — DIPHENHYDRAMINE HYDROCHLORIDE 50 MG/ML
12.5 INJECTION INTRAMUSCULAR; INTRAVENOUS
Status: DISCONTINUED | OUTPATIENT
Start: 2023-10-23 | End: 2023-10-23 | Stop reason: HOSPADM

## 2023-10-23 RX ORDER — OXYCODONE HYDROCHLORIDE 5 MG/1
5 TABLET ORAL EVERY 4 HOURS PRN
Status: ACTIVE | OUTPATIENT
Start: 2023-10-23 | End: 2023-10-24

## 2023-10-23 RX ORDER — SODIUM CHLORIDE, SODIUM LACTATE, POTASSIUM CHLORIDE, CALCIUM CHLORIDE 600; 310; 30; 20 MG/100ML; MG/100ML; MG/100ML; MG/100ML
INJECTION, SOLUTION INTRAVENOUS PRN
Status: DISCONTINUED | OUTPATIENT
Start: 2023-10-23 | End: 2023-10-26 | Stop reason: HOSPADM

## 2023-10-23 RX ORDER — MEPERIDINE HYDROCHLORIDE 25 MG/ML
12.5 INJECTION INTRAMUSCULAR; INTRAVENOUS; SUBCUTANEOUS
Status: DISCONTINUED | OUTPATIENT
Start: 2023-10-23 | End: 2023-10-23 | Stop reason: HOSPADM

## 2023-10-23 RX ORDER — HYDROMORPHONE HYDROCHLORIDE 1 MG/ML
0.4 INJECTION, SOLUTION INTRAMUSCULAR; INTRAVENOUS; SUBCUTANEOUS
Status: DISCONTINUED | OUTPATIENT
Start: 2023-10-23 | End: 2023-10-23 | Stop reason: HOSPADM

## 2023-10-23 RX ORDER — HYDRALAZINE HYDROCHLORIDE 20 MG/ML
5 INJECTION INTRAMUSCULAR; INTRAVENOUS
Status: DISCONTINUED | OUTPATIENT
Start: 2023-10-23 | End: 2023-10-23 | Stop reason: HOSPADM

## 2023-10-23 RX ORDER — ACETAMINOPHEN 500 MG
1000 TABLET ORAL EVERY 6 HOURS PRN
Status: DISCONTINUED | OUTPATIENT
Start: 2023-10-27 | End: 2023-10-26 | Stop reason: HOSPADM

## 2023-10-23 RX ORDER — DEXAMETHASONE SODIUM PHOSPHATE 4 MG/ML
INJECTION, SOLUTION INTRA-ARTICULAR; INTRALESIONAL; INTRAMUSCULAR; INTRAVENOUS; SOFT TISSUE PRN
Status: DISCONTINUED | OUTPATIENT
Start: 2023-10-23 | End: 2023-10-23 | Stop reason: SURG

## 2023-10-23 RX ORDER — ACETAMINOPHEN 500 MG
1000 TABLET ORAL EVERY 8 HOURS
Status: COMPLETED | OUTPATIENT
Start: 2023-10-23 | End: 2023-10-24

## 2023-10-23 RX ORDER — SODIUM CHLORIDE, SODIUM GLUCONATE, SODIUM ACETATE, POTASSIUM CHLORIDE AND MAGNESIUM CHLORIDE 526; 502; 368; 37; 30 MG/100ML; MG/100ML; MG/100ML; MG/100ML; MG/100ML
1500 INJECTION, SOLUTION INTRAVENOUS ONCE
Status: COMPLETED | OUTPATIENT
Start: 2023-10-23 | End: 2023-10-23

## 2023-10-23 RX ORDER — HYDROMORPHONE HYDROCHLORIDE 1 MG/ML
0.1 INJECTION, SOLUTION INTRAMUSCULAR; INTRAVENOUS; SUBCUTANEOUS
Status: DISCONTINUED | OUTPATIENT
Start: 2023-10-23 | End: 2023-10-23 | Stop reason: HOSPADM

## 2023-10-23 RX ORDER — IBUPROFEN 800 MG/1
800 TABLET ORAL EVERY 8 HOURS
Status: DISCONTINUED | OUTPATIENT
Start: 2023-10-24 | End: 2023-10-26 | Stop reason: HOSPADM

## 2023-10-23 RX ORDER — ONDANSETRON 2 MG/ML
4 INJECTION INTRAMUSCULAR; INTRAVENOUS EVERY 6 HOURS PRN
Status: DISCONTINUED | OUTPATIENT
Start: 2023-10-24 | End: 2023-10-26 | Stop reason: HOSPADM

## 2023-10-23 RX ORDER — MIDAZOLAM HYDROCHLORIDE 1 MG/ML
1 INJECTION INTRAMUSCULAR; INTRAVENOUS
Status: DISCONTINUED | OUTPATIENT
Start: 2023-10-23 | End: 2023-10-23 | Stop reason: HOSPADM

## 2023-10-23 RX ORDER — ONDANSETRON 2 MG/ML
4 INJECTION INTRAMUSCULAR; INTRAVENOUS EVERY 6 HOURS PRN
Status: ACTIVE | OUTPATIENT
Start: 2023-10-23 | End: 2023-10-24

## 2023-10-23 RX ORDER — OXYCODONE HCL 5 MG/5 ML
5 SOLUTION, ORAL ORAL
Status: DISCONTINUED | OUTPATIENT
Start: 2023-10-23 | End: 2023-10-23 | Stop reason: HOSPADM

## 2023-10-23 RX ORDER — ONDANSETRON 4 MG/1
4 TABLET, ORALLY DISINTEGRATING ORAL EVERY 6 HOURS PRN
Status: DISCONTINUED | OUTPATIENT
Start: 2023-10-24 | End: 2023-10-26 | Stop reason: HOSPADM

## 2023-10-23 RX ORDER — ACETAMINOPHEN 500 MG
1000 TABLET ORAL EVERY 6 HOURS
Status: DISCONTINUED | OUTPATIENT
Start: 2023-10-24 | End: 2023-10-26 | Stop reason: HOSPADM

## 2023-10-23 RX ORDER — IPRATROPIUM BROMIDE AND ALBUTEROL SULFATE 2.5; .5 MG/3ML; MG/3ML
3 SOLUTION RESPIRATORY (INHALATION)
Status: DISCONTINUED | OUTPATIENT
Start: 2023-10-23 | End: 2023-10-23 | Stop reason: HOSPADM

## 2023-10-23 RX ORDER — EPHEDRINE SULFATE 50 MG/ML
10 INJECTION, SOLUTION INTRAVENOUS
Status: ACTIVE | OUTPATIENT
Start: 2023-10-23 | End: 2023-10-23

## 2023-10-23 RX ORDER — OXYTOCIN 10 [USP'U]/ML
10 INJECTION, SOLUTION INTRAMUSCULAR; INTRAVENOUS
Status: DISCONTINUED | OUTPATIENT
Start: 2023-10-23 | End: 2023-10-26 | Stop reason: HOSPADM

## 2023-10-23 RX ORDER — HYDROMORPHONE HYDROCHLORIDE 1 MG/ML
0.2 INJECTION, SOLUTION INTRAMUSCULAR; INTRAVENOUS; SUBCUTANEOUS
Status: DISCONTINUED | OUTPATIENT
Start: 2023-10-23 | End: 2023-10-23 | Stop reason: HOSPADM

## 2023-10-23 RX ORDER — DIPHENHYDRAMINE HYDROCHLORIDE 50 MG/ML
25 INJECTION INTRAMUSCULAR; INTRAVENOUS EVERY 6 HOURS PRN
Status: ACTIVE | OUTPATIENT
Start: 2023-10-23 | End: 2023-10-24

## 2023-10-23 RX ORDER — METOCLOPRAMIDE HYDROCHLORIDE 5 MG/ML
10 INJECTION INTRAMUSCULAR; INTRAVENOUS ONCE
Status: COMPLETED | OUTPATIENT
Start: 2023-10-23 | End: 2023-10-23

## 2023-10-23 RX ORDER — OXYTOCIN 10 [USP'U]/ML
INJECTION, SOLUTION INTRAMUSCULAR; INTRAVENOUS PRN
Status: DISCONTINUED | OUTPATIENT
Start: 2023-10-23 | End: 2023-10-23 | Stop reason: SURG

## 2023-10-23 RX ORDER — KETOROLAC TROMETHAMINE 30 MG/ML
INJECTION, SOLUTION INTRAMUSCULAR; INTRAVENOUS PRN
Status: DISCONTINUED | OUTPATIENT
Start: 2023-10-23 | End: 2023-10-23 | Stop reason: SURG

## 2023-10-23 RX ORDER — OXYCODONE HYDROCHLORIDE 5 MG/1
5 TABLET ORAL EVERY 4 HOURS PRN
Status: DISCONTINUED | OUTPATIENT
Start: 2023-10-24 | End: 2023-10-26 | Stop reason: HOSPADM

## 2023-10-23 RX ADMIN — ACETAMINOPHEN 1000 MG: 500 TABLET, FILM COATED ORAL at 13:32

## 2023-10-23 RX ADMIN — OXYTOCIN 125 ML/HR: 10 INJECTION, SOLUTION INTRAMUSCULAR; INTRAVENOUS at 12:18

## 2023-10-23 RX ADMIN — SODIUM CHLORIDE, SODIUM GLUCONATE, SODIUM ACETATE, POTASSIUM CHLORIDE AND MAGNESIUM CHLORIDE: 526; 502; 368; 37; 30 INJECTION, SOLUTION INTRAVENOUS at 09:50

## 2023-10-23 RX ADMIN — DEXAMETHASONE SODIUM PHOSPHATE 8 MG: 4 INJECTION INTRA-ARTICULAR; INTRALESIONAL; INTRAMUSCULAR; INTRAVENOUS; SOFT TISSUE at 10:04

## 2023-10-23 RX ADMIN — KETOROLAC TROMETHAMINE 30 MG: 30 INJECTION, SOLUTION INTRAMUSCULAR; INTRAVENOUS at 21:43

## 2023-10-23 RX ADMIN — SODIUM CHLORIDE, SODIUM GLUCONATE, SODIUM ACETATE, POTASSIUM CHLORIDE AND MAGNESIUM CHLORIDE 1500 ML: 526; 502; 368; 37; 30 INJECTION, SOLUTION INTRAVENOUS at 08:20

## 2023-10-23 RX ADMIN — ACETAMINOPHEN 1000 MG: 500 TABLET, FILM COATED ORAL at 21:42

## 2023-10-23 RX ADMIN — OXYTOCIN 20 UNITS: 10 INJECTION, SOLUTION INTRAMUSCULAR; INTRAVENOUS at 10:27

## 2023-10-23 RX ADMIN — CEFAZOLIN 2 G: 1 INJECTION, POWDER, FOR SOLUTION INTRAMUSCULAR; INTRAVENOUS at 10:04

## 2023-10-23 RX ADMIN — SODIUM CHLORIDE, SODIUM GLUCONATE, SODIUM ACETATE, POTASSIUM CHLORIDE AND MAGNESIUM CHLORIDE: 526; 502; 368; 37; 30 INJECTION, SOLUTION INTRAVENOUS at 10:19

## 2023-10-23 RX ADMIN — SODIUM CHLORIDE 0.5 MCG/KG/MIN: 900 INJECTION INTRAVENOUS at 10:04

## 2023-10-23 RX ADMIN — FAMOTIDINE 20 MG: 10 INJECTION, SOLUTION INTRAVENOUS at 09:19

## 2023-10-23 RX ADMIN — METOCLOPRAMIDE 10 MG: 5 INJECTION, SOLUTION INTRAMUSCULAR; INTRAVENOUS at 09:19

## 2023-10-23 RX ADMIN — KETOROLAC TROMETHAMINE 30 MG: 30 INJECTION, SOLUTION INTRAMUSCULAR; INTRAVENOUS at 14:51

## 2023-10-23 RX ADMIN — KETOROLAC TROMETHAMINE 30 MG: 30 INJECTION, SOLUTION INTRAMUSCULAR; INTRAVENOUS at 10:35

## 2023-10-23 RX ADMIN — BUPIVACAINE HYDROCHLORIDE IN DEXTROSE 0.5 ML: 7.5 INJECTION, SOLUTION SUBARACHNOID at 10:05

## 2023-10-23 RX ADMIN — MORPHINE SULFATE 150 MCG: 0.5 INJECTION, SOLUTION EPIDURAL; INTRATHECAL; INTRAVENOUS at 10:05

## 2023-10-23 RX ADMIN — ONDANSETRON 4 MG: 2 INJECTION INTRAMUSCULAR; INTRAVENOUS at 10:35

## 2023-10-23 RX ADMIN — SODIUM CITRATE AND CITRIC ACID MONOHYDRATE 30 ML: 500; 334 SOLUTION ORAL at 09:20

## 2023-10-23 ASSESSMENT — LIFESTYLE VARIABLES
TOTAL SCORE: 0
CONSUMPTION TOTAL: INCOMPLETE
EVER FELT BAD OR GUILTY ABOUT YOUR DRINKING: NO
EVER HAD A DRINK FIRST THING IN THE MORNING TO STEADY YOUR NERVES TO GET RID OF A HANGOVER: NO
TOTAL SCORE: 0
TOTAL SCORE: 0
HAVE YOU EVER FELT YOU SHOULD CUT DOWN ON YOUR DRINKING: NO
EVER_SMOKED: NEVER
ALCOHOL_USE: NO
HAVE PEOPLE ANNOYED YOU BY CRITICIZING YOUR DRINKING: NO

## 2023-10-23 ASSESSMENT — COPD QUESTIONNAIRES
DO YOU EVER COUGH UP ANY MUCUS OR PHLEGM?: NO/ONLY WITH OCCASIONAL COLDS OR INFECTIONS
DURING THE PAST 4 WEEKS HOW MUCH DID YOU FEEL SHORT OF BREATH: NONE/LITTLE OF THE TIME
IN THE PAST 12 MONTHS DO YOU DO LESS THAN YOU USED TO BECAUSE OF YOUR BREATHING PROBLEMS: DISAGREE/UNSURE
COPD SCREENING SCORE: 0
HAVE YOU SMOKED AT LEAST 100 CIGARETTES IN YOUR ENTIRE LIFE: NO/DON'T KNOW

## 2023-10-23 ASSESSMENT — PATIENT HEALTH QUESTIONNAIRE - PHQ9
2. FEELING DOWN, DEPRESSED, IRRITABLE, OR HOPELESS: NOT AT ALL
SUM OF ALL RESPONSES TO PHQ9 QUESTIONS 1 AND 2: 0
1. LITTLE INTEREST OR PLEASURE IN DOING THINGS: NOT AT ALL

## 2023-10-23 ASSESSMENT — EDINBURGH POSTNATAL DEPRESSION SCALE (EPDS)
I HAVE FELT SAD OR MISERABLE: NO, NOT AT ALL
THINGS HAVE BEEN GETTING ON TOP OF ME: NO, I HAVE BEEN COPING AS WELL AS EVER
I HAVE BEEN SO UNHAPPY THAT I HAVE BEEN CRYING: NO, NEVER
I HAVE BEEN ABLE TO LAUGH AND SEE THE FUNNY SIDE OF THINGS: NOT QUITE SO MUCH NOW
I HAVE FELT SCARED OR PANICKY FOR NO GOOD REASON: NO, NOT AT ALL
I HAVE BEEN SO UNHAPPY THAT I HAVE HAD DIFFICULTY SLEEPING: NOT AT ALL
I HAVE LOOKED FORWARD WITH ENJOYMENT TO THINGS: AS MUCH AS I EVER DID
I HAVE BEEN ANXIOUS OR WORRIED FOR NO GOOD REASON: NO, NOT AT ALL
I HAVE BLAMED MYSELF UNNECESSARILY WHEN THINGS WENT WRONG: NO, NEVER
THE THOUGHT OF HARMING MYSELF HAS OCCURRED TO ME: NEVER

## 2023-10-23 ASSESSMENT — PAIN SCALES - GENERAL
PAINLEVEL: 0 - NO PAIN
PAIN_LEVEL: 0

## 2023-10-23 ASSESSMENT — PAIN DESCRIPTION - PAIN TYPE
TYPE: SURGICAL PAIN
TYPE: SURGICAL PAIN

## 2023-10-23 ASSESSMENT — FIBROSIS 4 INDEX: FIB4 SCORE: 0.48

## 2023-10-23 NOTE — ANESTHESIA TIME REPORT
Anesthesia Start and Stop Event Times     Date Time Event    10/23/2023 0922 Ready for Procedure     0950 Anesthesia Start     1059 Anesthesia Stop        Responsible Staff  10/23/23    Name Role Begin End    Luis Manuel Raman M.D. Anesth 0950 1059        Overtime Reason:  no overtime (within assigned shift)    Comments:

## 2023-10-23 NOTE — ANESTHESIA PREPROCEDURE EVALUATION
Case: [id number] Date/Time: 10/23/23 0915    Procedure: PRIMARY  SECTION    Pre-op diagnosis: BREECH PRESENTATION, 39 WEEKS    Location: [facility] OR 01 / SURGERY LABOR AND DELIVERY    Surgeons: Purvi German M.D.          Relevant Problems   Other   (positive) Breech presentation - pt wants , wants to try ECV - poor candidate       Physical Exam    Airway   Mallampati: IV  TM distance: <3 FB  Neck ROM: full       Cardiovascular - normal exam  Rhythm: regular  Rate: normal  (-) murmur     Dental - normal exam           Pulmonary - normal exam  Breath sounds clear to auscultation     Abdominal    Neurological - normal exam                 Anesthesia Plan    ASA 2       Plan - spinal   Neuraxial block will be primary anesthetic                Postoperative Plan: Postoperative administration of opioids is intended.    Pertinent diagnostic labs and testing reviewed    Informed Consent:    Anesthetic plan and risks discussed with patient.

## 2023-10-23 NOTE — H&P
"History and Physical      Laura Sheth is a 33 y.o. year old female  at 39w0d who presents for primary C/S due to breech presentation.  No contractions, leaking of fluid, vaginal bleeding.  The fetus is active.      ROS: A dry cough is noted.  She states it started 2 days after her recent flu shot.  She denies other URI symptoms.  Pertinent items are noted in HPI.    Past Medical History:   Diagnosis Date    Pregnant 10/17/2023     Past Surgical History:   Procedure Laterality Date    NO PERTINENT PAST SURGICAL HISTORY       OB History    Para Term  AB Living   1             SAB IAB Ectopic Molar Multiple Live Births                    # Outcome Date GA Lbr Puneet/2nd Weight Sex Delivery Anes PTL Lv   1 Current                  Allergies: Patient has no known allergies.    Current Facility-Administered Medications:     PHENYLEPHRINE 40 MG/250 ML NS PREMIX, , , ,     lactated ringers (LR) infusion, , Intravenous, Continuous, Purvi German M.D.    ceFAZolin (Ancef) injection 2 g, 2 g, Intravenous, Once, Purvi German M.D.    oxytocin (Pitocin) infusion bolus (for post delivery), 20 Units, Intravenous, Once **FOLLOWED BY** oxytocin (Pitocin) infusion (for post delivery), 125 mL/hr, Intravenous, Continuous, Purvi German M.D.    Prenatal care with [facility] starting at 24 weeks with following problems:  Patient Active Problem List    Diagnosis Date Noted    Labor and delivery indication for care or intervention 10/23/2023    Breech presentation - pt wants , wants to try ECV - poor candidate 10/19/2023    Encounter for supervision of normal first pregnancy, third trimester 2023    Thyroid mass - palpated LLQ - needs US, lab testing 2023               Objective:      /74   Pulse 70   Temp 36.4 °C (97.6 °F) (Temporal)   Resp 18   Ht 1.626 m (5' 4\")   Wt 69.4 kg (153 lb)   GENERAL: Alert, in no apparent distress  PSYCHIATRIC: Appropriate affect, intact insight and " judgement.  HEENT: PERRLA, EOMI, no scleral icterus  ABDOMEN: Soft, gravid, nontender.  EXTREMITIES: No edema, calves NT  SKIN: No rash    Limited abdominal ultrasound performed.  Breech presentation is noted with subjective oligohydramnios.    NST INTERPRETATION - PER MY READ    INDICATIONS: breech presentation  UTERINE ACTIVITY: irritability  BASELINE: 150s  VARIABILITY: moderate  ACCELERATIONS: present  DECELERATIONS: absent  Reactive NST        Lab:   Blood type: A +    Recent Results (from the past 5880 hour(s))   POCT Pregnancy    Collection Time: 06/22/23  3:34 PM   Result Value Ref Range    POC Urine Pregnancy Test Positive     Internal Control Positive Positive     Internal Control Negative Negative    TSH WITH REFLEX TO FT4    Collection Time: 08/16/23  9:54 AM   Result Value Ref Range    TSH 0.850 0.380 - 5.330 uIU/mL   PREGNANCY CENTER STANDARD PRENATAL PANEL    Collection Time: 08/16/23  9:54 AM   Result Value Ref Range    WBC 7.9 4.8 - 10.8 K/uL    RBC 4.02 (L) 4.20 - 5.40 M/uL    Hemoglobin 12.0 12.0 - 16.0 g/dL    Hematocrit 36.8 (L) 37.0 - 47.0 %    MCV 91.5 81.4 - 97.8 fL    MCH 29.9 27.0 - 33.0 pg    MCHC 32.6 32.2 - 35.5 g/dL    RDW 44.5 35.9 - 50.0 fL    Platelet Count 299 164 - 446 K/uL    MPV 9.8 9.0 - 12.9 fL    Hepatitis C Antibody Non-Reactive Non-Reactive    Hepatitis B Surface Antigen Non-Reactive Non-Reactive    Rubella IgG Antibody 107.00 IU/mL    Syphilis, Treponemal Qual Non-Reactive Non-Reactive   GLUCOSE 1HR GESTATIONAL    Collection Time: 08/16/23  9:54 AM   Result Value Ref Range    Glucose, Post Dose 118 70 - 139 mg/dL   URINE CULTURE(NEW)    Collection Time: 08/16/23  9:54 AM    Specimen: Urine   Result Value Ref Range    Significant Indicator NEG     Source UR     Site Clean Catch     Culture Result Usual urogenital isiah >100,000 cfu/mL    HIV AG/AB COMBO ASSAY SCREENING    Collection Time: 08/16/23  9:54 AM   Result Value Ref Range    HIV Ag/Ab Combo Assay Non-Reactive Non  Reactive   URINE DRUG SCREEN W/CONF (AR)    Collection Time: 08/16/23  9:54 AM   Result Value Ref Range    Urine Amphetamine-Methamphetam Negative Cutoff 300 ng/mL    Barbiturates Negative Cutoff 200 ng/mL    Benzodiazepines Negative Cutoff 200 ng/mL    Propoxyphene Negative Cutoff 300 ng/mL    Cocaine Metabolite Negative Cutoff 150 ng/mL    Methadone Negative Cutoff 150 ng/mL    Codeine-Morphine Negative Cutoff 300 ng/mL    Phencyclidine -Pcp Negative Cutoff 25 ng/mL    Cannabinoid Metab Negative Cutoff 50 ng/mL    Drug Comment Urine Drugs See Note    OP Prenatal Panel-Blood Bank    Collection Time: 08/16/23  9:54 AM   Result Value Ref Range    ABO Grouping Only A     Rh Grouping Only POS     Antibody Screen Scrn NEG    Chlamydia/GC, PCR (Urine)    Collection Time: 08/16/23 10:00 AM   Result Value Ref Range    C. trachomatis by PCR Negative Negative    Gc By Dna Probe Negative Negative    Source Urine    Comp Metabolic Panel    Collection Time: 09/26/23  3:10 PM   Result Value Ref Range    Sodium 133 (L) 135 - 145 mmol/L    Potassium 4.0 3.6 - 5.5 mmol/L    Chloride 100 96 - 112 mmol/L    Co2 21 20 - 33 mmol/L    Anion Gap 12.0 7.0 - 16.0    Glucose 72 65 - 99 mg/dL    Bun 7 (L) 8 - 22 mg/dL    Creatinine 0.45 (L) 0.50 - 1.40 mg/dL    Calcium 9.9 8.5 - 10.5 mg/dL    Correct Calcium 10.2 8.5 - 10.5 mg/dL    AST(SGOT) 24 12 - 45 U/L    ALT(SGPT) 29 2 - 50 U/L    Alkaline Phosphatase 217 (H) 30 - 99 U/L    Total Bilirubin 0.4 0.1 - 1.5 mg/dL    Albumin 3.6 3.2 - 4.9 g/dL    Total Protein 7.1 6.0 - 8.2 g/dL    Globulin 3.5 1.9 - 3.5 g/dL    A-G Ratio 1.0 g/dL   BILE ACIDS, SERUM    Collection Time: 09/26/23  3:10 PM   Result Value Ref Range    Bile Acids Serum 3 0 - 10 umol/L   ESTIMATED GFR    Collection Time: 09/26/23  3:10 PM   Result Value Ref Range    GFR (CKD-EPI) 131 >60 mL/min/1.73 m 2   GRP B STREP, BY PCR (MONTILLA BROTH)    Collection Time: 10/04/23  2:15 PM    Specimen: Genital   Result Value Ref Range    Strep  Gp B DNA PCR Negative Negative   CBC WITH DIFFERENTIAL    Collection Time: 10/23/23  8:20 AM   Result Value Ref Range    WBC 6.1 4.8 - 10.8 K/uL    RBC 4.42 4.20 - 5.40 M/uL    Hemoglobin 13.1 12.0 - 16.0 g/dL    Hematocrit 39.7 37.0 - 47.0 %    MCV 89.8 81.4 - 97.8 fL    MCH 29.6 27.0 - 33.0 pg    MCHC 33.0 32.2 - 35.5 g/dL    RDW 44.3 35.9 - 50.0 fL    Platelet Count 210 164 - 446 K/uL    MPV 11.2 9.0 - 12.9 fL    Neutrophils-Polys 61.20 44.00 - 72.00 %    Lymphocytes 26.40 22.00 - 41.00 %    Monocytes 10.00 0.00 - 13.40 %    Eosinophils 1.30 0.00 - 6.90 %    Basophils 0.80 0.00 - 1.80 %    Immature Granulocytes 0.30 0.00 - 0.90 %    Nucleated RBC 0.00 0.00 - 0.20 /100 WBC    Neutrophils (Absolute) 3.73 1.82 - 7.42 K/uL    Lymphs (Absolute) 1.61 1.00 - 4.80 K/uL    Monos (Absolute) 0.61 0.00 - 0.85 K/uL    Eos (Absolute) 0.08 0.00 - 0.51 K/uL    Baso (Absolute) 0.05 0.00 - 0.12 K/uL    Immature Granulocytes (abs) 0.02 0.00 - 0.11 K/uL    NRBC (Absolute) 0.00 K/uL   HOLD BLOOD BANK SPECIMEN (NOT TESTED)    Collection Time: 10/23/23  8:20 AM   Result Value Ref Range    Holding Tube - Bb DONE    T.PALLIDUM AB JAKE (SCREENING)    Collection Time: 10/23/23  8:20 AM   Result Value Ref Range    Syphilis, Treponemal Qual Non-Reactive Non-Reactive        Assessment:   Laura Sheth at 39w0d here for scheduled  delivery due to breech presentation.  The patient had originally wanted a trial of ECV, but we discussed that she is a poor candidate due to subjectively low fluid levels.  Recommend primary  delivery.    Discussed with the patient indications for  delivery. The patient voiced understanding of indications for  section at this time.    Discussed with the patient the risks of  delivery. The risks include bleeding, infection, transfusion, emergency hysterectomy to control bleeding, damage to surrounding organs (bowel, bladder, ureters, nerves, vessels), need for  repair or future surgery, fetal injury, unexpected pathology, anesthesia risks, and rarely death.  I also discussed with the patient the risk of wound infection, wound breakdown, and scarring. We discussed that these risks are greater in people that have diabetes or obesity.    The patient had the opportunity to ask questions regarding the procedure. All questions answered to the patient's satisfaction. Plan to proceed with  delivery.       Plan:     Admit to L&D  GBS negative  Primary  Delivery

## 2023-10-23 NOTE — PROGRESS NOTES
0733: Pt presents to L&D for primary c/s for breech presentation. Pt is a  at 39.0 weeks gestation. POC discussed using  services.  0950: Pt ambulated to OR1  1046: Viable male per Dr. German, infant assigned 4/6/8 APGARS, infant to NBN for observation.  1056: Pt to PACU in stable condition.  1156: Pt transferred to postpartum via rney, report given to Lisa WILLOUGHBY. Pt in stable condition with a firm fundus and light lochia.        .

## 2023-10-23 NOTE — OP REPORT
PREOPERATIVE DIAGNOSIS:  IUP at 39-0/7 weeks  Breech presentation    POSTOPERATIVE DIAGNOSIS:  Same    PROCEDURE: Primary Low Transverse  Section via Pfannensteil    SURGEON: Purvi German MD    ASSISTANT: Shakila Maria MD    ANESTHESIA: spinal    COMPLICATIONS: none    EBL:  700 cc    FLUIDS: 700 cc LR    URINE OUTPUT: 100 cc    INDICATIONS: 33-year-old G1, P0 at 39-0/7 weeks here for scheduled  due to breech presentation, subjectively low fluid noted on ultrasound today.    FINDINGS: Viable male infant in breech presentation with meconium stained fluid, apgars 4,6, and 8, weight pending. Normal uterus, tubes, ovaries.     PROCEDURE IN DETAIL: The patient was taken to the operating room where  spinal anesthesia was found to be adequate.  She was then prepped and draped in the normal sterile fashion in the dorsal supine position with a leftward hip tilt.  A Pfannenstiel skin incision was then made with the scalpel and carried through to the underlying layer of fascia, which was incised in the midline and the incision was extended laterally with the Montes scissors.  The superior aspect of the fascial incision was then grasped with Kocher clamps, elevated, and the underlying rectus muscles dissected off bluntly and sharply.  Attention was then turned to the inferior aspect of this incision which, in a similar fashion, was grasped, tented up with Kocher clamps, and the rectus muscle was dissected off bluntly and sharply.  The rectus muscles were then  in the midline, and the peritoneum was identified, tented up, and entered sharply with the Metzenbaum scissors.  The peritoneal incision was then extended superiorly and inferiorly with good visualization of the bladder.  The bladder blade was then inserted and the vesicouterine peritoneum identified, grasped with pickups, and entered sharply with the Metzenbaum scissors.  This incision was then extended laterally and the bladder flap was created  digitally.  The bladder blade was then reinserted and the lower uterine segment incised in a transverse fashion with the scalpel.  The incision was extended in a cephalocaudad fashion bluntly with fingers.  The infant's breech was grasped and delivered through the incision.  The infant was delivered to the level of the chest and rotated sacrum anterior.  The arms were brought to the midline and delivered.  The head was flexed and delivered without difficulty.  The infant was stimulated and the nose and mouth were bulb suctioned on the field.  The cord was clamped and cut after approximately 30 seconds.  The infant was then handed off to the awaiting attendant.  Cord gases were obtained.  The placenta was then removed manually, the uterus exteriorized, and cleared of all clot and debris.  The uterine incision was repaired with 1-0 chromic in a running, locked fashion.  Excellent hemostasis was noted.  The uterus was returned to the abdomen and the gutters were cleared of all clot and debris.  The fascia was approximated with 0 Vicryl in a running fashion.  The subcuticular fat was irrigated.  Pablito's fascia was closed with interrupted sutures of 2-0 Vicryl.  The skin was closed with 4-0 Monocryl in a subcuticular fashion.  The patient tolerated the procedure well.  Sponge lap and needle counts were correct x3.  The patient was taken to the recovery room in stable condition.

## 2023-10-23 NOTE — LACTATION NOTE
This note was copied from a baby's chart.  At bedside for 2nd attempted latch on right breast. Utilized  language line and bilingual FOB.     MOB plans to breast and bottle feed.     MOB is primip. Breasts soft and intact on assessment. Nipples are firm and appear flat, with some inversion on compression. Instructed and demonstrated hand expression. Pt was able to return demonstrate technique. Colostrum not yet expressed.     Instructed MOB and FOB re: skin to skin, on-demand feeding, and feeding cues. Assisted MOB to put baby skin to skin and attempted latch on right breast in cross-cradle. Pt able to return demonstrate nose-to-nipple, tummy-to-tummy positioning. Baby latched intermittently, fussed at breast and slept. Attempt appeared non-nutritive. Left baby skin to skin with LINETTE RN at bedside for infant heel-stick blood glucose.    Plan to f/u tomorrow with pt.

## 2023-10-23 NOTE — ANESTHESIA POSTPROCEDURE EVALUATION
Patient: Laura Sheth    Procedure Summary     Date: 10/23/23 Room / Location: [facility] OR 01 / SURGERY LABOR AND DELIVERY    Anesthesia Start: 950 Anesthesia Stop:     Procedure: PRIMARY  SECTION Diagnosis:       Status post primary low transverse  section      (BREECH PRESENTATION, 39 WEEKS)    Surgeons: Purvi German M.D. Responsible Provider: Luis Manuel Raman M.D.    Anesthesia Type: spinal ASA Status: 2          Final Anesthesia Type: spinal  Last vitals  BP   Blood Pressure: 107/74    Temp   36.4 °C (97.6 °F)    Pulse   70   Resp   18    SpO2          Anesthesia Post Evaluation    Patient location during evaluation: PACU  Patient participation: complete - patient participated  Level of consciousness: awake and alert  Pain score: 0    Airway patency: patent  Anesthetic complications: no  Cardiovascular status: hemodynamically stable  Respiratory status: acceptable  Hydration status: euvolemic    PONV: none          No notable events documented.

## 2023-10-23 NOTE — PROGRESS NOTES
Received report from Sandrita HAYNES&MIREILLE WILLOUGHBY. Utilizing  Luis ([id number]) Orientated patient to room, call light and emergency light education provided. Assessment completed, fundus firm, lochia light. Abdominal incision with Mepilex silver dressing intact clean and dry. Plan of care reviewed, patient verbalized understanding. Denies pain at this time, will call if pain med intervention is needed.

## 2023-10-23 NOTE — ANESTHESIA PROCEDURE NOTES
Spinal Block    Date/Time: 10/23/2023 10:05 AM    Performed by: Luis Manuel Raman M.D.  Authorized by: Luis Manuel Raman M.D.    Patient Location:  OR  Start Time:  10/23/2023 10:05 AM  End Time:  10/23/2023 10:10 AM  Reason for Block: primary anesthetic    patient identified, IV checked, site marked, risks and benefits discussed, surgical consent, monitors and equipment checked, pre-op evaluation and timeout performed    Patient Position:  Sitting  Prep: ChloraPrep, patient draped and sterile technique    Monitoring:  Blood pressure, continuous pulse oximetry and heart rate  Approach:  Midline  Location:  L3-4  Injection Technique:  Single-shot  Skin infiltration:  Lidocaine  Strength:  1%  Dose:  3ml  Needle Type:  Pencan  Needle Gauge:  25 G  CSF flowing pre/post injection:  Yes  Sensory Level:  T4

## 2023-10-24 LAB
ERYTHROCYTE [DISTWIDTH] IN BLOOD BY AUTOMATED COUNT: 44 FL (ref 35.9–50)
HCT VFR BLD AUTO: 29.6 % (ref 37–47)
HGB BLD-MCNC: 9.9 G/DL (ref 12–16)
MCH RBC QN AUTO: 30.4 PG (ref 27–33)
MCHC RBC AUTO-ENTMCNC: 33.4 G/DL (ref 32.2–35.5)
MCV RBC AUTO: 90.8 FL (ref 81.4–97.8)
PLATELET # BLD AUTO: 179 K/UL (ref 164–446)
PMV BLD AUTO: 11 FL (ref 9–12.9)
RBC # BLD AUTO: 3.26 M/UL (ref 4.2–5.4)
WBC # BLD AUTO: 10.2 K/UL (ref 4.8–10.8)

## 2023-10-24 PROCEDURE — 700102 HCHG RX REV CODE 250 W/ 637 OVERRIDE(OP): Performed by: FAMILY MEDICINE

## 2023-10-24 PROCEDURE — 36415 COLL VENOUS BLD VENIPUNCTURE: CPT

## 2023-10-24 PROCEDURE — 770002 HCHG ROOM/CARE - OB PRIVATE (112)

## 2023-10-24 PROCEDURE — 700111 HCHG RX REV CODE 636 W/ 250 OVERRIDE (IP): Mod: JZ | Performed by: ANESTHESIOLOGY

## 2023-10-24 PROCEDURE — A9270 NON-COVERED ITEM OR SERVICE: HCPCS | Performed by: FAMILY MEDICINE

## 2023-10-24 PROCEDURE — 85027 COMPLETE CBC AUTOMATED: CPT

## 2023-10-24 PROCEDURE — A9270 NON-COVERED ITEM OR SERVICE: HCPCS | Performed by: ANESTHESIOLOGY

## 2023-10-24 PROCEDURE — 700102 HCHG RX REV CODE 250 W/ 637 OVERRIDE(OP): Performed by: ANESTHESIOLOGY

## 2023-10-24 RX ADMIN — KETOROLAC TROMETHAMINE 30 MG: 30 INJECTION, SOLUTION INTRAMUSCULAR; INTRAVENOUS at 03:50

## 2023-10-24 RX ADMIN — POLYETHYLENE GLYCOL 3350 1 PACKET: 17 POWDER, FOR SOLUTION ORAL at 05:46

## 2023-10-24 RX ADMIN — SIMETHICONE 125 MG: 125 TABLET, CHEWABLE ORAL at 11:37

## 2023-10-24 RX ADMIN — KETOROLAC TROMETHAMINE 30 MG: 30 INJECTION, SOLUTION INTRAMUSCULAR; INTRAVENOUS at 08:56

## 2023-10-24 RX ADMIN — ACETAMINOPHEN 1000 MG: 500 TABLET, FILM COATED ORAL at 21:22

## 2023-10-24 RX ADMIN — ACETAMINOPHEN 1000 MG: 500 TABLET, FILM COATED ORAL at 05:45

## 2023-10-24 RX ADMIN — ACETAMINOPHEN 1000 MG: 500 TABLET, FILM COATED ORAL at 14:56

## 2023-10-24 RX ADMIN — IBUPROFEN 800 MG: 800 TABLET, FILM COATED ORAL at 18:19

## 2023-10-24 RX ADMIN — PRENATAL WITH FERROUS FUM AND FOLIC ACID 1 TABLET: 3080; 920; 120; 400; 22; 1.84; 3; 20; 10; 1; 12; 200; 27; 25; 2 TABLET ORAL at 08:55

## 2023-10-24 ASSESSMENT — PAIN DESCRIPTION - PAIN TYPE
TYPE: SURGICAL PAIN
TYPE: ACUTE PAIN;SURGICAL PAIN
TYPE: SURGICAL PAIN
TYPE: SURGICAL PAIN

## 2023-10-24 NOTE — LACTATION NOTE
This note was copied from a baby's chart.  I provided Laura with a manual breast pump to help her liane her nipples for latching and also stimulate milk. We did express 1 drop of colostrum with hand expression. I assisted with burping technique for baby. Plan is to breast feed and then supplement with appropriate small volumes of formula.  I encouraged parents to spend time with baby on mother's chest skin to skin, which they agreed to.

## 2023-10-24 NOTE — CARE PLAN
Problem: Pain - Standard  Goal: Alleviation of pain or a reduction in pain to the patient’s comfort goal  10/23/2023 1758 by Florian Waggoner R.N.  Outcome: Progressing  10/23/2023 1756 by Florian Waggoner R.N.  Outcome: Progressing     Problem: Early Mobilization - Post Surgery  Goal: Early mobilization post surgery  Outcome: Progressing   The patient is Stable - Low risk of patient condition declining or worsening    Shift Goals  Clinical Goals: Pain control, lochia WNL, maintain stable vitals  Patient Goals: Rest  Family Goals: Plan of care updates    Progress made toward(s) clinical / shift goals:  Patient pain,lochia and vital signs remained stable.    Patient is not progressing towards the following goals:

## 2023-10-24 NOTE — CARE PLAN
The patient is Stable - Low risk of patient condition declining or worsening    Shift Goals  Clinical Goals: patient will remain clinically stable  Patient Goals:   Family Goals:     Progress made toward(s) clinical / shift goals:      Problem: Pain - Standard  Goal: Alleviation of pain or a reduction in pain to the patient’s comfort goal  Outcome: Progressing     Problem: Altered Physiologic Condition  Goal: Patient physiologically stable as evidenced by normal lochia, palpable uterine involution and vitals within normal limits  Outcome: Progressing     Patient is able to verbalize pain and request for pain management if needed. Patient is receiving scheduled medication for management. Fundus is firm at umbilicus with scant lochia rubra.    Patient is not progressing towards the following goals:

## 2023-10-24 NOTE — PROGRESS NOTES
2015: Assumed care of patient, assessment completed. Patient refusing  at this time, prefers that FOB translate. Fundus is firm at umbilicus with scant lochia rubra. Patient reports 0/10 pain at this time. Plan of care discussed, patient verbalized understanding.

## 2023-10-24 NOTE — PROGRESS NOTES
0725-  Received  shift report from Alla WILLOUGHBY. Patient observed in stable condition. Bed in lowest position, call light, personal belongings all within reach.

## 2023-10-24 NOTE — PROGRESS NOTES
0945 - notified MD Laurent and Michelle vang of patients H/H awaiting orders from MD. Patient denies dizziness or fatique, educated to notify this RN with any signs of lightheadness/dizziness.     1007- MD Michelle aware no new orders at this time.

## 2023-10-24 NOTE — PROGRESS NOTES
Post Partum Progress Note    Name:   Laura Sheth   Date/Time:  10/24/2023 - 8:02 AM  Chief Admitting Dx:  Labor and delivery indication for care or intervention [O75.9]  Delivery Type:   for breech  Post-Op/Post Partum Days #:  1    Subjective:  Abdominal pain: no  Ambulating:   yes  Tolerating liquids:  yes  Tolerating food:  yes common adult  Flatus:   no  BM:    no  Bleeding:   with a small amount of bleeding  Voiding:   yes  Dizziness:   no  Feeding:   both breast and bottle - Similac with iron    Vitals:    10/24/23 0010 10/24/23 0125 10/24/23 0200 10/24/23 0600   BP:   100/56 107/61   Pulse: 68 64 67 64   Resp: 18 18 18 16   Temp:   36.7 °C (98 °F) 36.6 °C (97.9 °F)   TempSrc:   Temporal Temporal   SpO2: 95% 97% 96% 95%   Weight:       Height:           Exam:  Breast: Tenderness no  Abdomen: Abdomen soft, non-tender. BS normal. No masses,  No organomegaly  Fundal Tenderness:  no  Fundus Firm: yes  Incision: dry and intact  Below umbilicus: yes  Perineum: perineum intact  Lochia: moderate  Extremities: Normal extremities, peripheral pulses and reflexes normal, no edema, redness or tenderness in the calves or thighs    Meds:  Current Facility-Administered Medications   Medication Dose    lactated ringers (LR) infusion      LR infusion      oxytocin (Pitocin) infusion bolus (for post delivery)  20 Units    Followed by    oxytocin (Pitocin) infusion (for post delivery)  125 mL/hr    oxytocin (Pitocin) injection 10 Units  10 Units    lactated ringers infusion      oxyCODONE immediate-release (Roxicodone) tablet 5 mg  5 mg    oxyCODONE immediate release (Roxicodone) tablet 10 mg  10 mg    HYDROmorphone (Dilaudid) injection 0.2 mg  0.2 mg    HYDROmorphone (Dilaudid) injection 0.4 mg  0.4 mg    ondansetron (Zofran) syringe/vial injection 4 mg  4 mg    diphenhydrAMINE (Benadryl) injection 12.5 mg  12.5 mg    diphenhydrAMINE (Benadryl) injection 12.5 mg  12.5 mg    Or    diphenhydrAMINE  (Benadryl) injection 25 mg  25 mg    Or    naloxone HCl (Narcan) 20 mg in  mL infusion  0.4 mg/hr    lactated ringers infusion      ibuprofen (Motrin) tablet 800 mg  800 mg    Followed by    [START ON 10/27/2023] ibuprofen (Motrin) tablet 800 mg  800 mg    acetaminophen (Tylenol) tablet 1,000 mg  1,000 mg    Followed by    [START ON 10/27/2023] acetaminophen (Tylenol) tablet 1,000 mg  1,000 mg    oxyCODONE immediate-release (Roxicodone) tablet 5 mg  5 mg    oxyCODONE immediate release (Roxicodone) tablet 10 mg  10 mg    ondansetron (Zofran) syringe/vial injection 4 mg  4 mg    Or    ondansetron (Zofran ODT) dispertab 4 mg  4 mg    diphenhydrAMINE (Benadryl) tablet/capsule 25 mg  25 mg    Or    diphenhydrAMINE (Benadryl) injection 25 mg  25 mg    polyethylene glycol/lytes (Miralax) PACKET 1 Packet  1 Packet    prenatal plus vitamin (Stuartnatal 1+1) 27-1 MG tablet 1 Tablet  1 Tablet    simethicone (Mylicon) chewable tablet 125 mg  125 mg    calcium carbonate (Tums) chewable tab 1,000 mg  1,000 mg    ketorolac (Toradol) injection 30 mg  30 mg    Or    ketorolac (Toradol) injection 30 mg  30 mg       Labs:   Recent Labs     10/23/23  0820 10/24/23  0516   WBC 6.1 10.2   RBC 4.42 3.26*   HEMOGLOBIN 13.1 9.9*   HEMATOCRIT 39.7 29.6*   MCV 89.8 90.8   MCH 29.6 30.4   MCHC 33.0 33.4   RDW 44.3 44.0   PLATELETCT 210 179   MPV 11.2 11.0       Assessment:  Chief Admitting Dx:  Labor and delivery indication for care or intervention [O75.9]  Delivery Type:   for breech  Tubal Ligation:  no    Plan:  Continue routine post partum care.  POD #1    SHANEKA Ernst

## 2023-10-25 PROCEDURE — 700111 HCHG RX REV CODE 636 W/ 250 OVERRIDE (IP): Performed by: FAMILY MEDICINE

## 2023-10-25 PROCEDURE — 700102 HCHG RX REV CODE 250 W/ 637 OVERRIDE(OP): Performed by: NURSE PRACTITIONER

## 2023-10-25 PROCEDURE — A9270 NON-COVERED ITEM OR SERVICE: HCPCS | Performed by: NURSE PRACTITIONER

## 2023-10-25 PROCEDURE — A9270 NON-COVERED ITEM OR SERVICE: HCPCS | Performed by: FAMILY MEDICINE

## 2023-10-25 PROCEDURE — 700102 HCHG RX REV CODE 250 W/ 637 OVERRIDE(OP): Performed by: FAMILY MEDICINE

## 2023-10-25 PROCEDURE — 770002 HCHG ROOM/CARE - OB PRIVATE (112)

## 2023-10-25 RX ORDER — DOCUSATE SODIUM 100 MG/1
100 CAPSULE, LIQUID FILLED ORAL 2 TIMES DAILY
Status: DISCONTINUED | OUTPATIENT
Start: 2023-10-25 | End: 2023-10-26 | Stop reason: HOSPADM

## 2023-10-25 RX ADMIN — ACETAMINOPHEN 1000 MG: 500 TABLET, FILM COATED ORAL at 22:26

## 2023-10-25 RX ADMIN — ACETAMINOPHEN 1000 MG: 500 TABLET, FILM COATED ORAL at 15:50

## 2023-10-25 RX ADMIN — POLYETHYLENE GLYCOL 3350 1 PACKET: 17 POWDER, FOR SOLUTION ORAL at 06:11

## 2023-10-25 RX ADMIN — IBUPROFEN 800 MG: 800 TABLET, FILM COATED ORAL at 02:32

## 2023-10-25 RX ADMIN — ONDANSETRON 4 MG: 4 TABLET, ORALLY DISINTEGRATING ORAL at 08:57

## 2023-10-25 RX ADMIN — IBUPROFEN 800 MG: 800 TABLET, FILM COATED ORAL at 18:14

## 2023-10-25 RX ADMIN — ACETAMINOPHEN 1000 MG: 500 TABLET, FILM COATED ORAL at 08:57

## 2023-10-25 RX ADMIN — PRENATAL WITH FERROUS FUM AND FOLIC ACID 1 TABLET: 3080; 920; 120; 400; 22; 1.84; 3; 20; 10; 1; 12; 200; 27; 25; 2 TABLET ORAL at 08:57

## 2023-10-25 RX ADMIN — DOCUSATE SODIUM 100 MG: 100 CAPSULE, LIQUID FILLED ORAL at 22:27

## 2023-10-25 RX ADMIN — ACETAMINOPHEN 1000 MG: 500 TABLET, FILM COATED ORAL at 02:32

## 2023-10-25 RX ADMIN — IBUPROFEN 800 MG: 800 TABLET, FILM COATED ORAL at 10:34

## 2023-10-25 ASSESSMENT — PAIN DESCRIPTION - PAIN TYPE
TYPE: ACUTE PAIN
TYPE: SURGICAL PAIN;ACUTE PAIN
TYPE: SURGICAL PAIN
TYPE: SURGICAL PAIN

## 2023-10-25 NOTE — CARE PLAN
The patient is Stable - Low risk of patient condition declining or worsening    Shift Goals  Clinical Goals: VSS, fundus firm, pain management  Patient Goals: rest, pain management  Family Goals: support    Progress made toward(s) clinical / shift goals:    Problem: Knowledge Deficit - Postpartum  Goal: Patient will verbalize and demonstrate understanding of self and infant care  Outcome: Progressing     Problem: Psychosocial - Postpartum  Goal: Patient will verbalize and demonstrate effective bonding and parenting behavior  Outcome: Progressing     Problem: Altered Physiologic Condition  Goal: Patient physiologically stable as evidenced by normal lochia, palpable uterine involution and vitals within normal limits  Outcome: Progressing     Problem: Infection - Postpartum  Goal: Postpartum patient will be free of signs and symptoms of infection  Outcome: Progressing       Patient is not progressing towards the following goals:

## 2023-10-25 NOTE — PROGRESS NOTES
2030: Received report from FARTUN Burnette. Assuming care.    2110: Assessment complete. Fundus firm, lochia light. VSS. Tolerating diet. Voiding without difficulty. Incision dressing CDI. Pt states passing gas. Pain controlled with scheduled medications per MAR. Will offer pain medications as they become available. FOB at bedside, bonding with pt/baby. POC discussed with pt. Encouraged to call with needs. Call light in place.

## 2023-10-25 NOTE — CARE PLAN
The patient is Stable - Low risk of patient condition declining or worsening    Shift Goals  Clinical Goals: Maintain  stable vital signs, lochia WNL and  pain management  Patient Goals: Rest infant bonding  Family Goals: Plan of care of updates    Problem: Psychosocial - Postpartum  Goal: Patient will verbalize and demonstrate effective bonding and parenting behavior  Outcome: Progressing  Note: MOB has shown adequate bonding with infant, provided skin to skin, and proper cares of infant.       Problem: Knowledge Deficit - Postpartum  Goal: Patient will verbalize and demonstrate understanding of self and infant care  Outcome: Progressing  Note: MOB demonstrates and verbalizes understanding on how to care for . Asks appropriate questions and calls for help when necessary. Education has been provided to patient.

## 2023-10-25 NOTE — CARE PLAN
The patient is Stable - Low risk of patient condition declining or worsening    Shift Goals  Clinical Goals: VSS, fundus and lochia WNL, pain management  Patient Goals: rest, pain management  Family Goals: support    Progress made toward(s) clinical / shift goals: Pt's VS stable. Fundus firm, lochia light. Pain managed with scheduled pain medications per MAR. Pt resting with support person at bedside.    Problem: Pain - Standard  Goal: Alleviation of pain or a reduction in pain to the patient’s comfort goal  Outcome: Progressing     Problem: Altered Physiologic Condition  Goal: Patient physiologically stable as evidenced by normal lochia, palpable uterine involution and vitals within normal limits  Outcome: Progressing     Patient is not progressing towards the following goals: NA

## 2023-10-25 NOTE — PROGRESS NOTES
Post Partum Progress Note    Name:   Laura Sheth   Date/Time:  10/25/2023 - 4:07 AM  Chief Admitting Dx:  Labor and delivery indication for care or intervention [O75.9]  Delivery Type:   for breech  Post-Op/Post Partum Days #:  2    Subjective:  Abdominal pain: no  Ambulating:   yes  Tolerating liquids:  yes  Tolerating food:  yes common adult  Flatus:   yes  BM:    no  Bleeding:   without any bleeding  Voiding:   yes  Dizziness:   no  Feeding:   Pumping and working on BF'ing    Vitals:    10/24/23 0600 10/24/23 1000 10/24/23 1400 10/24/23 1819   BP: 107/61 100/55 95/64 99/62   Pulse: 64 65 87 90   Resp: 16 17 17 17   Temp: 36.6 °C (97.9 °F) 36.3 °C (97.4 °F) 36.6 °C (97.8 °F) 36.4 °C (97.6 °F)   TempSrc: Temporal Temporal Temporal Temporal   SpO2: 95% 97% 93% 97%   Weight:       Height:           Exam:  Breast: Tenderness no, Engorged no, and Lactating yes- able to easily hand express, but she is concerned about how much milk she has. Education provided, will send lactation in to discuss more.  Abdomen: Abdomen soft, non-tender. BS normal. No masses,  No organomegaly  Fundal Tenderness:  no  Fundus Firm: yes  Incision: dry and intact  Below umbilicus: yes  Perineum: perineum intact  Lochia: mild  Extremities: Normal extremities, peripheral pulses and reflexes normal, no edema, redness or tenderness in the calves or thighs, Homans sign is negative, no sign of DVT, feet normal, good pulses, normal color, temperature and sensation    Meds:  Current Facility-Administered Medications   Medication Dose    lactated ringers (LR) infusion      oxytocin (Pitocin) infusion (for post delivery)  125 mL/hr    oxytocin (Pitocin) injection 10 Units  10 Units    lactated ringers infusion      lactated ringers infusion      ibuprofen (Motrin) tablet 800 mg  800 mg    Followed by    [START ON 10/27/2023] ibuprofen (Motrin) tablet 800 mg  800 mg    acetaminophen (Tylenol) tablet 1,000 mg  1,000 mg     Followed by    [START ON 10/27/2023] acetaminophen (Tylenol) tablet 1,000 mg  1,000 mg    oxyCODONE immediate-release (Roxicodone) tablet 5 mg  5 mg    oxyCODONE immediate release (Roxicodone) tablet 10 mg  10 mg    ondansetron (Zofran) syringe/vial injection 4 mg  4 mg    Or    ondansetron (Zofran ODT) dispertab 4 mg  4 mg    diphenhydrAMINE (Benadryl) tablet/capsule 25 mg  25 mg    Or    diphenhydrAMINE (Benadryl) injection 25 mg  25 mg    polyethylene glycol/lytes (Miralax) PACKET 1 Packet  1 Packet    prenatal plus vitamin (Stuartnatal 1+1) 27-1 MG tablet 1 Tablet  1 Tablet    simethicone (Mylicon) chewable tablet 125 mg  125 mg    calcium carbonate (Tums) chewable tab 1,000 mg  1,000 mg       Labs:   Recent Labs     10/23/23  0820 10/24/23  0516   WBC 6.1 10.2   RBC 4.42 3.26*   HEMOGLOBIN 13.1 9.9*   HEMATOCRIT 39.7 29.6*   MCV 89.8 90.8   MCH 29.6 30.4   MCHC 33.0 33.4   RDW 44.3 44.0   PLATELETCT 210 179   MPV 11.2 11.0       Assessment:  Chief Admitting Dx:  Labor and delivery indication for care or intervention [O75.9]  Delivery Type:   for breech  Tubal Ligation:  no    Plan:  Continue routine post partum care.  Lactation support  Anticipate d/c POD#3    Isadora Turner C.N.M.

## 2023-10-26 ENCOUNTER — PHARMACY VISIT (OUTPATIENT)
Dept: PHARMACY | Facility: MEDICAL CENTER | Age: 33
End: 2023-10-26
Payer: COMMERCIAL

## 2023-10-26 VITALS
BODY MASS INDEX: 26.12 KG/M2 | SYSTOLIC BLOOD PRESSURE: 107 MMHG | RESPIRATION RATE: 18 BRPM | HEART RATE: 78 BPM | HEIGHT: 64 IN | DIASTOLIC BLOOD PRESSURE: 67 MMHG | TEMPERATURE: 97.9 F | WEIGHT: 153 LBS | OXYGEN SATURATION: 97 %

## 2023-10-26 PROBLEM — Z34.03 ENCOUNTER FOR SUPERVISION OF NORMAL FIRST PREGNANCY, THIRD TRIMESTER: Status: RESOLVED | Noted: 2023-08-25 | Resolved: 2023-10-26

## 2023-10-26 PROCEDURE — A9270 NON-COVERED ITEM OR SERVICE: HCPCS | Performed by: FAMILY MEDICINE

## 2023-10-26 PROCEDURE — A9270 NON-COVERED ITEM OR SERVICE: HCPCS | Performed by: NURSE PRACTITIONER

## 2023-10-26 PROCEDURE — RXMED WILLOW AMBULATORY MEDICATION CHARGE: Performed by: NURSE PRACTITIONER

## 2023-10-26 PROCEDURE — 700102 HCHG RX REV CODE 250 W/ 637 OVERRIDE(OP): Performed by: NURSE PRACTITIONER

## 2023-10-26 PROCEDURE — 700102 HCHG RX REV CODE 250 W/ 637 OVERRIDE(OP): Performed by: FAMILY MEDICINE

## 2023-10-26 RX ORDER — OXYCODONE HYDROCHLORIDE 5 MG/1
5 TABLET ORAL EVERY 4 HOURS PRN
Qty: 15 TABLET | Refills: 0 | Status: SHIPPED | OUTPATIENT
Start: 2023-10-26 | End: 2023-11-02

## 2023-10-26 RX ORDER — IBUPROFEN 800 MG/1
800 TABLET ORAL EVERY 8 HOURS
Qty: 30 TABLET | Refills: 0 | Status: SHIPPED | OUTPATIENT
Start: 2023-10-26

## 2023-10-26 RX ORDER — ACETAMINOPHEN 500 MG
1000 TABLET ORAL EVERY 6 HOURS
Qty: 30 TABLET | Refills: 0 | COMMUNITY
Start: 2023-10-26

## 2023-10-26 RX ADMIN — IBUPROFEN 800 MG: 800 TABLET, FILM COATED ORAL at 09:50

## 2023-10-26 RX ADMIN — ACETAMINOPHEN 1000 MG: 500 TABLET, FILM COATED ORAL at 12:04

## 2023-10-26 RX ADMIN — PRENATAL WITH FERROUS FUM AND FOLIC ACID 1 TABLET: 3080; 920; 120; 400; 22; 1.84; 3; 20; 10; 1; 12; 200; 27; 25; 2 TABLET ORAL at 09:49

## 2023-10-26 RX ADMIN — IBUPROFEN 800 MG: 800 TABLET, FILM COATED ORAL at 01:49

## 2023-10-26 RX ADMIN — POLYETHYLENE GLYCOL 3350 1 PACKET: 17 POWDER, FOR SOLUTION ORAL at 06:40

## 2023-10-26 RX ADMIN — DOCUSATE SODIUM 100 MG: 100 CAPSULE, LIQUID FILLED ORAL at 06:33

## 2023-10-26 RX ADMIN — ACETAMINOPHEN 1000 MG: 500 TABLET, FILM COATED ORAL at 06:33

## 2023-10-26 ASSESSMENT — PAIN DESCRIPTION - PAIN TYPE
TYPE: SURGICAL PAIN

## 2023-10-26 NOTE — DISCHARGE INSTRUCTIONS

## 2023-10-26 NOTE — CARE PLAN
The patient is Stable - Low risk of patient condition declining or worsening    Shift Goals  Clinical Goals: VSS, pain management  Patient Goals: rest, pain management  Family Goals: support    Progress made toward(s) clinical / shift goals:    Problem: Knowledge Deficit - Postpartum  Goal: Patient will verbalize and demonstrate understanding of self and infant care  Outcome: Progressing     Problem: Psychosocial - Postpartum  Goal: Patient will verbalize and demonstrate effective bonding and parenting behavior  Outcome: Progressing     Problem: Altered Physiologic Condition  Goal: Patient physiologically stable as evidenced by normal lochia, palpable uterine involution and vitals within normal limits  Outcome: Progressing     Problem: Infection - Postpartum  Goal: Postpartum patient will be free of signs and symptoms of infection  Outcome: Progressing     Problem: Bowel Elimination - Post Surgical  Goal: Patient will resume regular bowel sounds and function with no discomfort or distention  Outcome: Progressing       Patient is not progressing towards the following goals:

## 2023-10-26 NOTE — DISCHARGE SUMMARY
Discharge Summary:      Laura Sheth    Admit Date:   10/23/2023  Discharge Date:  10/26/2023     Admitting diagnosis:  Labor and delivery indication for care or intervention [O75.9]  Discharge Diagnosis: Status post  for breech.  Pregnancy Complications: none  Tubal Ligation:  no        History:  Past Medical History:   Diagnosis Date    Pregnant 10/17/2023     OB History    Para Term  AB Living   1 1 1     1   SAB IAB Ectopic Molar Multiple Live Births           0 1      # Outcome Date GA Lbr Puneet/2nd Weight Sex Delivery Anes PTL Lv   1 Term 10/23/23 39w0d  3.11 kg (6 lb 13.7 oz) M CS-LTranv Spinal N EDGAR        Patient has no known allergies.  Patient Active Problem List    Diagnosis Date Noted    Postpartum care following  delivery 10/26/2023     delivery delivered 10/26/2023    Labor and delivery indication for care or intervention 10/23/2023    Thyroid mass - palpated LLQ - needs US, lab testing 2023        Hospital Course:   33 y.o. , now para 1, was admitted with the above mentioned diagnosis, underwent Primary  breech,  for breech. Patient postpartum course was unremarkable, with progressive advancement in diet , ambulation and toleration of oral analgesia. Patient without complaints today and desires discharge.      Vitals:    10/25/23 0553 10/25/23 0815 10/25/23 1800 10/26/23 0600   BP: 98/56 104/64 103/67 107/67   Pulse: 61  81 78   Resp: 16  16 18   Temp: 36.8 °C (98.3 °F)  36.8 °C (98.3 °F) 36.6 °C (97.9 °F)   TempSrc: Temporal  Temporal Temporal   SpO2: 100%  97% 97%   Weight:       Height:           Current Facility-Administered Medications   Medication Dose    docusate sodium (Colace) capsule 100 mg  100 mg    lactated ringers (LR) infusion      oxytocin (Pitocin) infusion (for post delivery)  125 mL/hr    oxytocin (Pitocin) injection 10 Units  10 Units    lactated ringers infusion      lactated ringers  infusion      ibuprofen (Motrin) tablet 800 mg  800 mg    Followed by    [START ON 10/27/2023] ibuprofen (Motrin) tablet 800 mg  800 mg    acetaminophen (Tylenol) tablet 1,000 mg  1,000 mg    Followed by    [START ON 10/27/2023] acetaminophen (Tylenol) tablet 1,000 mg  1,000 mg    oxyCODONE immediate-release (Roxicodone) tablet 5 mg  5 mg    oxyCODONE immediate release (Roxicodone) tablet 10 mg  10 mg    ondansetron (Zofran) syringe/vial injection 4 mg  4 mg    Or    ondansetron (Zofran ODT) dispertab 4 mg  4 mg    diphenhydrAMINE (Benadryl) tablet/capsule 25 mg  25 mg    Or    diphenhydrAMINE (Benadryl) injection 25 mg  25 mg    polyethylene glycol/lytes (Miralax) PACKET 1 Packet  1 Packet    prenatal plus vitamin (Stuartnatal 1+1) 27-1 MG tablet 1 Tablet  1 Tablet    simethicone (Mylicon) chewable tablet 125 mg  125 mg    calcium carbonate (Tums) chewable tab 1,000 mg  1,000 mg       Exam:  Breast Exam: Inspection negative. No nipple discharge or bleeding. No masses or nodularity palpable  Abdomen: Abdomen soft, non-tender. BS normal. No masses,  No organomegaly  Fundus Non Tender: no  Incision: dry and intact  Perineum: perineum intact  Extremity: extremities, peripheral pulses and reflexes normal, no edema, redness or tenderness in the calves or thighs, Homans sign is negative, no sign of DVT, feet normal, good pulses, normal color, temperature and sensation     Labs:  Recent Labs     10/23/23  0820 10/24/23  0516   WBC 6.1 10.2   RBC 4.42 3.26*   HEMOGLOBIN 13.1 9.9*   HEMATOCRIT 39.7 29.6*   MCV 89.8 90.8   MCH 29.6 30.4   MCHC 33.0 33.4   RDW 44.3 44.0   PLATELETCT 210 179   MPV 11.2 11.0        Activity:   Discharge to home  Pelvic Rest x 6 weeks    Assessment:  normal postpartum course  Discharge Assessment: Taking adequate diet and fluids, no heavy bleeding or foul discharge. Voiding without difficulty     Follow up: .[facility] in 5 weeks for vaginal ; 1 week for incision check.       Discharge Meds:   Current Outpatient Medications   Medication Sig Dispense Refill    acetaminophen (TYLENOL) 500 MG Tab Take 2 Tablets by mouth every 6 hours. 30 Tablet 0    ibuprofen (MOTRIN) 800 MG Tab Take 1 Tablet by mouth every 8 hours. 30 Tablet 0    oxyCODONE immediate-release (ROXICODONE) 5 MG Tab Take 1 Tablet by mouth every four hours as needed for Severe Pain for up to 7 days. 15 Tablet 0     Pt need to RT TPC or ER if any of the following occur:  Fever over 100,5  Severe abd pain  Red streaks or painful masses in the breasts  Foul smelling d/c or lochia  Heavy vaginal bleeding saturating a pad per hour  S/s of PP depression   S/s of PP PIH  Unsure of desired PP BCM    Isadora Turner, C.N.M.

## 2023-10-26 NOTE — LACTATION NOTE
This note was copied from a baby's chart.  Mom is a 34 y/o P1 who delivered baby boy weighing 6 # 13.7 oz at 39 wks. Mom has been pumping and offering bottles and occasionally offering breast. Mom states that she does not have any milk. LC discussed supply and demand and the surging of breast milk.. LC briefly discussed the  colostrum, transitional and mature milk phases and stomach sizes.  LC educated mother on when to offer bottles and importance of putting baby to breast if her desire is to nurse the baby.  LC demonstrated hand expression and was able to get colostrum from right side and performed reverse pressure softening. LC reviewed d the supplemental guidelines for DOL and output according to DOL and changes in stooling pattern in first 5 days.   LC offered assistance with latching baby and MOB agreed. Mom was sat upright in bed and pillows were placed on right side in FB hold. Baby took several attempts to latch and eventually settled in to a rhythmic jaw glide.  Swallows were seen and heard by moth LC and mom.   LC reviewed hand massage while nursing and noticed a softening to her breast after several minutes. Baby fed for approx 10 minutes on right side and mother was instructed to switch baby over to left after burping.Mom encouraged to call for assistance if needed.  Mom has a manual pump at home and was enrolled in [place] . Mom has an appointment for Friday at 3 pm.  Mom verbally understands education and has no further concerns.  Translation used Isi # [id number] and Mruiel # [id number] was used after accidental disconnection occurred.

## 2023-10-26 NOTE — PROGRESS NOTES
0700- Received report from night rn. Assumed care. 12 hour chart check, MAR and orders reviewed.      0815- Assessment complete. Fundus firm and palpable, lochia light rubra. Pain management and interventions discussed with pt. POC discussed. All questions and concerns discussed. No further concerns.

## 2023-10-26 NOTE — PROGRESS NOTES
Assumed care of patient, report at bedside from, Eloisa WILLOUGHBY. Assessment completed and WDL. Call light within reach, discussed plan of care, denies pain at the moment. Will continue to monitor.

## 2023-10-27 NOTE — PROGRESS NOTES
Discharge paperwork for infant and mom discussed at bedside. All questions answered. Follow-up appointments reviewed. Parents aware of NBS #2 and dates to complete it by. Paperwork signed and dated at this time.    1816- Patient and infant discharged with escort off unit. Infant discharged in car seat, patient in wheelchair. Infant placed in car seat by parents. Cuddles removed. Bands verified. All questions answered at this time.

## 2023-11-02 ENCOUNTER — GYNECOLOGY VISIT (OUTPATIENT)
Dept: OBGYN | Facility: CLINIC | Age: 33
End: 2023-11-02
Payer: MEDICAID

## 2023-11-02 VITALS — WEIGHT: 134.8 LBS | DIASTOLIC BLOOD PRESSURE: 69 MMHG | SYSTOLIC BLOOD PRESSURE: 118 MMHG | BODY MASS INDEX: 23.14 KG/M2

## 2023-11-02 DIAGNOSIS — Z48.89 ENCOUNTER FOR POSTOPERATIVE CARE: ICD-10-CM

## 2023-11-02 PROCEDURE — 3074F SYST BP LT 130 MM HG: CPT | Performed by: OBSTETRICS & GYNECOLOGY

## 2023-11-02 PROCEDURE — 3078F DIAST BP <80 MM HG: CPT | Performed by: OBSTETRICS & GYNECOLOGY

## 2023-11-02 PROCEDURE — 0502F SUBSEQUENT PRENATAL CARE: CPT | Performed by: OBSTETRICS & GYNECOLOGY

## 2023-11-02 ASSESSMENT — FIBROSIS 4 INDEX: FIB4 SCORE: 0.8

## 2023-11-02 NOTE — PROGRESS NOTES
Patient is here for Post Op Visit- C/S check. She delivered on 10/23/23 via c- section. She is having a little bit of bleeding and is having a little bit of pain. Her Weight is 134.8 lb and BP is 118/69.

## 2023-11-02 NOTE — PROGRESS NOTES
Postoperative Follow Up Visit    Laura Sheth is a 33 y.o. female    Chief complaint    Chief Complaint   Patient presents with    Post-op     C/s check       S/p 1LTCS on 10/23/23 for breech presentation presenting for postop check    COMPLAINTS:    No F/C,, no N/V. Pain well controlled. Minimal bleeding. No constipation or diarrhea. Bottle feeding infant. Voiding well.       OBJECTIVE:    /69 (BP Location: Right arm, Patient Position: Sitting, BP Cuff Size: Adult)   Wt 134 lb 12.8 oz   LMP 02/08/2023   BMI 23.14 kg/m²     General: No acute distress, well nourished, alert.     ABDOMEN: Soft nondistended, nontender, no peritoneal signs, no masses.     INCISION: Clean, dry, intact, no drainage, erythema or separation. Mepilex removed. Steri strips intact.     A/P    1. Encounter for postoperative care        S/p 1LTCS on 10/23/23 for breech presentation, doing well.     Advised no heavy lifting, swimming, tub bathing or anything in the vagina until her postpartum visit.  Incision care reviewed.     Follow up in 5 weeks for final postpartum visit    Kelin Julien M.D.    Obstetrics and Gynecology    11/2/20232:52 PM

## 2023-12-05 ENCOUNTER — HOSPITAL ENCOUNTER (OUTPATIENT)
Facility: MEDICAL CENTER | Age: 33
End: 2023-12-05
Attending: OBSTETRICS & GYNECOLOGY
Payer: COMMERCIAL

## 2023-12-05 ENCOUNTER — POST PARTUM (OUTPATIENT)
Dept: OBGYN | Facility: CLINIC | Age: 33
End: 2023-12-05

## 2023-12-05 VITALS — SYSTOLIC BLOOD PRESSURE: 100 MMHG | DIASTOLIC BLOOD PRESSURE: 50 MMHG | WEIGHT: 133 LBS | BODY MASS INDEX: 22.83 KG/M2

## 2023-12-05 DIAGNOSIS — Z30.011 ENCOUNTER FOR INITIAL PRESCRIPTION OF CONTRACEPTIVE PILLS: ICD-10-CM

## 2023-12-05 PROCEDURE — 3074F SYST BP LT 130 MM HG: CPT | Performed by: OBSTETRICS & GYNECOLOGY

## 2023-12-05 PROCEDURE — 0503F POSTPARTUM CARE VISIT: CPT | Performed by: OBSTETRICS & GYNECOLOGY

## 2023-12-05 PROCEDURE — 3078F DIAST BP <80 MM HG: CPT | Performed by: OBSTETRICS & GYNECOLOGY

## 2023-12-05 RX ORDER — NORGESTIMATE AND ETHINYL ESTRADIOL 0.25-0.035
1 KIT ORAL DAILY
Qty: 84 TABLET | Refills: 3 | Status: SHIPPED | OUTPATIENT
Start: 2023-12-05

## 2023-12-05 ASSESSMENT — EDINBURGH POSTNATAL DEPRESSION SCALE (EPDS)
I HAVE FELT SAD OR MISERABLE: NO, NOT AT ALL
THINGS HAVE BEEN GETTING ON TOP OF ME: NO, I HAVE BEEN COPING AS WELL AS EVER
I HAVE BEEN ABLE TO LAUGH AND SEE THE FUNNY SIDE OF THINGS: AS MUCH AS I ALWAYS COULD
I HAVE BEEN SO UNHAPPY THAT I HAVE BEEN CRYING: NO, NEVER
I HAVE LOOKED FORWARD WITH ENJOYMENT TO THINGS: AS MUCH AS I EVER DID
I HAVE BEEN ANXIOUS OR WORRIED FOR NO GOOD REASON: NO, NOT AT ALL
I HAVE BLAMED MYSELF UNNECESSARILY WHEN THINGS WENT WRONG: NO, NEVER
I HAVE BEEN SO UNHAPPY THAT I HAVE HAD DIFFICULTY SLEEPING: NOT AT ALL
I HAVE FELT SCARED OR PANICKY FOR NO GOOD REASON: NO, NOT AT ALL
TOTAL SCORE: 0
THE THOUGHT OF HARMING MYSELF HAS OCCURRED TO ME: NEVER

## 2023-12-05 ASSESSMENT — FIBROSIS 4 INDEX: FIB4 SCORE: 0.8

## 2023-12-05 NOTE — PROGRESS NOTES
POST PARTUM VISIT NOTE    SUBJECTIVE:  Laura Sheth is a 33 y.o.  who presents for postpartum exam.   I have reviewed the prenatal and intrapartum course.     Date of delivery :  10/23/2023  Type of delivery:      ROS:  She feels well healed.   Laceration and/or Incision is well healed.   Bleeding lasted about 4-6 weeks.  She is getting along well with her partner.   She reports her mood is good. Denies postpartum blues.   She has not had sex.   Denies urinary or stool incontinence.    Last pap date: never had, collected today     Delivery laceration(s): no  Feeding: Formula and breast feeding some  Postpartum course: uncomplicated  Contraceptive plans: Desires to use OCPs    I have reviewed the patient's PMH for contraceptive risk factors, and she has no contraindications to contraception as planned.     OBJECTIVE:    Vitals:    23 1512   BP: 100/50       Appears well, vital signs normal.  Breast Exam: exam deferred.  Abdomen: soft, nontender  Pelvic Exam:  Perineum: perineum intact and well healed  Vulva: No external lesions, normal hair distribution, no adenopathy  Urethra: appears normal with no lesions  Vagina: Normal vaginal discharge  Cervix: Normal    EPDS score:  0    ASSESSMENT:  normal postpartum course    PLAN:  Pap collected  Lactation consult ordered, mostly formula feeding, but would like a lactation consult  Contraception planned:  OCPS    Discussed contraceptive options including LARCs.  She has not interested in any LARCs at this time She is interested in OCPs. She denies any contraindications to starting the combination oral contraceptive pills such as hypertension, liver disease, history of DVT or stroke, history of breast cancer, etc. Side effects of the oral contraceptive pill were discussed including decreased libido, headaches, and mood changes.  There is a rare risk of a DVT or a stroke with a combination oral contraceptive pill, but this is low in  young, healthy women.  She can start the oral contraceptive pill on the first day of her next cycle. She plans to use condoms until she starts taking the OCP. Discussed taking it the same time everyday for effectiveness.      Follow up: In 2 months for a OCP check.    Leena Mendes M.D.

## 2023-12-05 NOTE — PROGRESS NOTES
Pt here today for postpartum exam.  Delivery type: C/S 10/23/23   Currently : bottle feeding   Desired BCM:    LMP: NA   Last pap: done today   Phone # [phone] (home)   EPDS -0

## 2023-12-08 LAB
C TRACH RRNA CVX QL NAA+PROBE: NEGATIVE
CYTOLOGIST CVX/VAG CYTO: NORMAL
CYTOLOGY CVX/VAG DOC CYTO: NORMAL
CYTOLOGY CVX/VAG DOC THIN PREP: NORMAL
HPV I/H RISK 4 DNA CVX QL PROBE+SIG AMP: NEGATIVE
N GONORRHOEA RRNA CVX QL NAA+PROBE: NEGATIVE
NOTE NL11727A: NORMAL
OTHER STN SPEC: NORMAL
STAT OF ADQ CVX/VAG CYTO-IMP: NORMAL

## 2025-01-28 ENCOUNTER — HOSPITAL ENCOUNTER (OUTPATIENT)
Dept: RADIOLOGY | Facility: MEDICAL CENTER | Age: 35
End: 2025-01-28
Attending: FAMILY MEDICINE

## 2025-01-28 DIAGNOSIS — E04.1 THYROID NODULE: ICD-10-CM

## (undated) DEVICE — BAG SPONGE COUNT 10.25 X 32 - BLUE (250/CA)

## (undated) DEVICE — SUTURE 0 VICRYL PLUS CT-1 - 36 INCH (36/BX)

## (undated) DEVICE — ELECTRODE DUAL RETURN W/ CORD - (50/PK)

## (undated) DEVICE — SODIUM CHL IRRIGATION 0.9% 1000ML (12EA/CA)

## (undated) DEVICE — STERI STRIP COMPOUND BENZOIN - TINCTURE 0.6ML WITH APPLICATOR (40EA/BX)

## (undated) DEVICE — PACK C-SECTION (2EA/CA)

## (undated) DEVICE — WATER IRRIGATION STERILE 1000ML (12EA/CA)

## (undated) DEVICE — HEAD HOLDER JUNIOR/ADULT

## (undated) DEVICE — PAD LAP STERILE 18 X 18 - (5/PK 40PK/CA)

## (undated) DEVICE — SUTURE 0 VICRYL PLUS CT 36 (36PK/BX)"

## (undated) DEVICE — BLANKET UNDERBODY ADULT - (10/CA)

## (undated) DEVICE — CANNULA O2 COMFORT SOFT EAR ADULT 7 FT TUBING (50/CA)

## (undated) DEVICE — TUBING CLEARLINK DUO-VENT - C-FLO (48EA/CA)

## (undated) DEVICE — CHLORAPREP 26 ML APPLICATOR - ORANGE TINT(25/CA)

## (undated) DEVICE — GLOVE BIOGEL INDICATOR SZ 7SURGICAL PF LTX - (50/BX 4BX/CA)

## (undated) DEVICE — GLOVE BIOGEL SZ 7 SURGICAL PF LTX - (50PR/BX 4BX/CA)

## (undated) DEVICE — CANISTER SUCTION 1200 CC MECHANICAL FILTER AUTO SHUT OFF MEDI-VAC STERILE LF DISP - (40EA/CA)

## (undated) DEVICE — SUTURE 3-0 VICRYL PLUS CT-1 - 36 INCH (36/BX)

## (undated) DEVICE — SLEEVE, SEQUENTIAL CALF REG

## (undated) DEVICE — SUTURE 1 CHROMIC CTX ETHICON - (36PK/BX)

## (undated) DEVICE — BLANKET UNDERBODY FULL ACCES - (5/CA)

## (undated) DEVICE — KIT SKIN NOSE AND MOUTH PRE-OP (20/CA)

## (undated) DEVICE — SUTURE 4-0 MONOCRYL PLUS PS-2 - 27 INCH (36/BX)

## (undated) DEVICE — SET EXTENSION WITH 2 PORTS (48EA/CA) ***PART #2C8610 IS A SUBSTITUTE*****

## (undated) DEVICE — BLANKET STERILE CHICKIE FOR L&D (100/CA)

## (undated) DEVICE — KIT  I.V. START (100EA/CA)

## (undated) DEVICE — CLOSURE SKIN STRIP 1/2 X 4 IN - (STERI STRIP) (50/BX 4BX/CA)

## (undated) DEVICE — SUTURE 1 CHROMIC CT-1 (36PK/BX)

## (undated) DEVICE — DRESSING POST OP BORDER 4 X 10 (5EA/BX)

## (undated) DEVICE — SOLUTION PLASMA-LYTE PH 7.4 INJ 1000ML  (14EA/CA)

## (undated) DEVICE — CANISTER SUCTION 3000ML MECHANICAL FILTER AUTO SHUTOFF MEDI-VAC NONSTERILE LF DISP  (40EA/CA)

## (undated) DEVICE — CATHETER IV NON-SAFETY 18 GA X 1 1/4 (50/BX 4BX/CA)

## (undated) DEVICE — TRAY SPINAL ANESTHESIA NON-SAFETY (10/CA)

## (undated) DEVICE — PENCIL ELECTSURG 10FT BTN SWH - (50/CA)